# Patient Record
Sex: FEMALE | Race: WHITE | NOT HISPANIC OR LATINO | Employment: FULL TIME | URBAN - METROPOLITAN AREA
[De-identification: names, ages, dates, MRNs, and addresses within clinical notes are randomized per-mention and may not be internally consistent; named-entity substitution may affect disease eponyms.]

---

## 2019-08-16 ENCOUNTER — OFFICE VISIT (OUTPATIENT)
Dept: FAMILY MEDICINE | Facility: CLINIC | Age: 39
End: 2019-08-16
Payer: COMMERCIAL

## 2019-08-16 ENCOUNTER — LAB VISIT (OUTPATIENT)
Dept: LAB | Facility: HOSPITAL | Age: 39
End: 2019-08-16
Attending: NURSE PRACTITIONER
Payer: COMMERCIAL

## 2019-08-16 VITALS
TEMPERATURE: 98 F | BODY MASS INDEX: 22.77 KG/M2 | DIASTOLIC BLOOD PRESSURE: 70 MMHG | HEIGHT: 64 IN | SYSTOLIC BLOOD PRESSURE: 102 MMHG | WEIGHT: 133.38 LBS | OXYGEN SATURATION: 98 % | HEART RATE: 69 BPM

## 2019-08-16 DIAGNOSIS — M21.619 BUNION: ICD-10-CM

## 2019-08-16 DIAGNOSIS — Z76.89 ENCOUNTER TO ESTABLISH CARE WITH NEW DOCTOR: ICD-10-CM

## 2019-08-16 DIAGNOSIS — E03.9 HYPOTHYROIDISM, UNSPECIFIED TYPE: ICD-10-CM

## 2019-08-16 DIAGNOSIS — K64.4 EXTERNAL HEMORRHOIDS: ICD-10-CM

## 2019-08-16 DIAGNOSIS — Z76.89 ENCOUNTER TO ESTABLISH CARE WITH NEW DOCTOR: Primary | ICD-10-CM

## 2019-08-16 LAB
ALBUMIN SERPL BCP-MCNC: 4.2 G/DL (ref 3.5–5.2)
ALP SERPL-CCNC: 44 U/L (ref 55–135)
ALT SERPL W/O P-5'-P-CCNC: 14 U/L (ref 10–44)
ANION GAP SERPL CALC-SCNC: 8 MMOL/L (ref 8–16)
AST SERPL-CCNC: 18 U/L (ref 10–40)
BASOPHILS # BLD AUTO: 0.03 K/UL (ref 0–0.2)
BASOPHILS NFR BLD: 0.5 % (ref 0–1.9)
BILIRUB SERPL-MCNC: 0.3 MG/DL (ref 0.1–1)
BUN SERPL-MCNC: 15 MG/DL (ref 6–20)
CALCIUM SERPL-MCNC: 10.2 MG/DL (ref 8.7–10.5)
CHLORIDE SERPL-SCNC: 105 MMOL/L (ref 95–110)
CHOLEST SERPL-MCNC: 207 MG/DL (ref 120–199)
CHOLEST/HDLC SERPL: 4.4 {RATIO} (ref 2–5)
CO2 SERPL-SCNC: 27 MMOL/L (ref 23–29)
CREAT SERPL-MCNC: 0.8 MG/DL (ref 0.5–1.4)
DIFFERENTIAL METHOD: ABNORMAL
EOSINOPHIL # BLD AUTO: 0.3 K/UL (ref 0–0.5)
EOSINOPHIL NFR BLD: 4.6 % (ref 0–8)
ERYTHROCYTE [DISTWIDTH] IN BLOOD BY AUTOMATED COUNT: 13.1 % (ref 11.5–14.5)
EST. GFR  (AFRICAN AMERICAN): >60 ML/MIN/1.73 M^2
EST. GFR  (NON AFRICAN AMERICAN): >60 ML/MIN/1.73 M^2
GLUCOSE SERPL-MCNC: 81 MG/DL (ref 70–110)
HCT VFR BLD AUTO: 38.4 % (ref 37–48.5)
HDLC SERPL-MCNC: 47 MG/DL (ref 40–75)
HDLC SERPL: 22.7 % (ref 20–50)
HGB BLD-MCNC: 12 G/DL (ref 12–16)
IMM GRANULOCYTES # BLD AUTO: 0.02 K/UL (ref 0–0.04)
IMM GRANULOCYTES NFR BLD AUTO: 0.4 % (ref 0–0.5)
LDLC SERPL CALC-MCNC: 132 MG/DL (ref 63–159)
LYMPHOCYTES # BLD AUTO: 2.1 K/UL (ref 1–4.8)
LYMPHOCYTES NFR BLD: 37.5 % (ref 18–48)
MCH RBC QN AUTO: 27.1 PG (ref 27–31)
MCHC RBC AUTO-ENTMCNC: 31.3 G/DL (ref 32–36)
MCV RBC AUTO: 87 FL (ref 82–98)
MONOCYTES # BLD AUTO: 0.3 K/UL (ref 0.3–1)
MONOCYTES NFR BLD: 5.5 % (ref 4–15)
NEUTROPHILS # BLD AUTO: 2.9 K/UL (ref 1.8–7.7)
NEUTROPHILS NFR BLD: 51.5 % (ref 38–73)
NONHDLC SERPL-MCNC: 160 MG/DL
NRBC BLD-RTO: 0 /100 WBC
PLATELET # BLD AUTO: 235 K/UL (ref 150–350)
PMV BLD AUTO: 10 FL (ref 9.2–12.9)
POTASSIUM SERPL-SCNC: 4.4 MMOL/L (ref 3.5–5.1)
PROT SERPL-MCNC: 7.7 G/DL (ref 6–8.4)
RBC # BLD AUTO: 4.43 M/UL (ref 4–5.4)
SODIUM SERPL-SCNC: 140 MMOL/L (ref 136–145)
T4 FREE SERPL-MCNC: 0.85 NG/DL (ref 0.71–1.51)
TRIGL SERPL-MCNC: 140 MG/DL (ref 30–150)
TSH SERPL DL<=0.005 MIU/L-ACNC: 4.44 UIU/ML (ref 0.4–4)
WBC # BLD AUTO: 5.6 K/UL (ref 3.9–12.7)

## 2019-08-16 PROCEDURE — 3008F PR BODY MASS INDEX (BMI) DOCUMENTED: ICD-10-PCS | Mod: CPTII,S$GLB,, | Performed by: NURSE PRACTITIONER

## 2019-08-16 PROCEDURE — 84443 ASSAY THYROID STIM HORMONE: CPT

## 2019-08-16 PROCEDURE — 80053 COMPREHEN METABOLIC PANEL: CPT

## 2019-08-16 PROCEDURE — 99204 OFFICE O/P NEW MOD 45 MIN: CPT | Mod: S$GLB,,, | Performed by: NURSE PRACTITIONER

## 2019-08-16 PROCEDURE — 84439 ASSAY OF FREE THYROXINE: CPT

## 2019-08-16 PROCEDURE — 3008F BODY MASS INDEX DOCD: CPT | Mod: CPTII,S$GLB,, | Performed by: NURSE PRACTITIONER

## 2019-08-16 PROCEDURE — 85025 COMPLETE CBC W/AUTO DIFF WBC: CPT

## 2019-08-16 PROCEDURE — 99999 PR PBB SHADOW E&M-EST. PATIENT-LVL V: ICD-10-PCS | Mod: PBBFAC,,, | Performed by: NURSE PRACTITIONER

## 2019-08-16 PROCEDURE — 80061 LIPID PANEL: CPT

## 2019-08-16 PROCEDURE — 36415 COLL VENOUS BLD VENIPUNCTURE: CPT | Mod: PO

## 2019-08-16 PROCEDURE — 99204 PR OFFICE/OUTPT VISIT, NEW, LEVL IV, 45-59 MIN: ICD-10-PCS | Mod: S$GLB,,, | Performed by: NURSE PRACTITIONER

## 2019-08-16 PROCEDURE — 99999 PR PBB SHADOW E&M-EST. PATIENT-LVL V: CPT | Mod: PBBFAC,,, | Performed by: NURSE PRACTITIONER

## 2019-08-16 NOTE — PROGRESS NOTES
"Subjective:       Patient ID: Manuela Quinones is a 39 y.o. female.    Chief Complaint: Annual Exam    Thyroid Problem   Presents for follow-up visit. Symptoms include dry skin and fatigue. Patient reports no anxiety, constipation, diarrhea, hair loss, heat intolerance, hoarse voice, menstrual problem, nail problem, palpitations or tremors. The symptoms have been stable.       Past Medical History:   Diagnosis Date    Hypothyroidism        Review of patient's allergies indicates:  No Known Allergies      Current Outpatient Medications:     levothyroxine (SYNTHROID) 25 MCG tablet, Take 1 tablet (25 mcg total) by mouth once daily., Disp: 30 tablet, Rfl: 11    Review of Systems   Constitutional: Positive for fatigue. Negative for activity change and unexpected weight change.   HENT: Negative for hearing loss, hoarse voice, rhinorrhea and trouble swallowing.    Eyes: Negative for discharge and visual disturbance.   Respiratory: Negative for chest tightness and wheezing.    Cardiovascular: Negative for chest pain and palpitations.   Gastrointestinal: Positive for blood in stool. Negative for constipation, diarrhea and vomiting.   Endocrine: Negative for heat intolerance, polydipsia and polyuria.   Genitourinary: Negative for difficulty urinating, dysuria, hematuria and menstrual problem.   Musculoskeletal: Positive for neck pain. Negative for arthralgias and joint swelling.   Neurological: Negative for tremors, weakness and headaches.   Psychiatric/Behavioral: Negative for confusion and dysphoric mood. The patient is not nervous/anxious.        Objective:      /70 (BP Location: Right arm, Patient Position: Sitting, BP Method: Medium (Manual))   Pulse 69   Temp 98.4 °F (36.9 °C) (Oral)   Ht 5' 4" (1.626 m)   Wt 60.5 kg (133 lb 6.1 oz)   SpO2 98%   BMI 22.89 kg/m²   Physical Exam   Constitutional: She is oriented to person, place, and time. She appears well-developed and well-nourished.   Eyes: Pupils are " equal, round, and reactive to light. EOM are normal.   Neck: Normal range of motion.   Cardiovascular: Normal rate, regular rhythm and normal heart sounds.   Pulmonary/Chest: Effort normal and breath sounds normal.   Abdominal: Soft. Bowel sounds are normal.   Musculoskeletal: Normal range of motion.   Neurological: She is alert and oriented to person, place, and time.   Skin: Skin is warm and dry.   Psychiatric: She has a normal mood and affect. Her behavior is normal. Judgment and thought content normal.       Assessment:       1. Encounter to establish care with new doctor    2. Hypothyroidism, unspecified type    3. External hemorrhoids    4. Bunion    5. BMI 22.0-22.9, adult        Plan:       Encounter to establish care with new doctor  -     Lipid panel; Future; Expected date: 08/16/2019  -     Comprehensive metabolic panel; Future; Expected date: 08/16/2019  -     CBC W/ AUTO DIFFERENTIAL; Future; Expected date: 08/16/2019    Hypothyroidism, unspecified type  -     TSH; Future; Expected date: 08/16/2019  -     T4, free; Future; Expected date: 08/16/2019    External hemorrhoids  -     Ambulatory referral to Colorectal Surgery  Patient complains of hemorrhoids for 10 years. Patient reports trying lifestyle changes and OTC medication without relief and wants them removed.  Bunion  -     Ambulatory referral to Podiatry    BMI 22.0-22.9, adult  Healthy weight in patient      Time spent with patient: 30 minutes    Patient with be reevaluated in 3 months or sooner prn    Greater than 50% of this visit was spent counseling as described in above documentation:Yes

## 2019-08-16 NOTE — PATIENT INSTRUCTIONS
"  Treating Bunions  Although a bunion wont go away, wearing shoes that fit properly will often relieve the pain. Padding and icing the bunion may also help. Bunions that remain painful may need surgery.     Heels: Heel height should be low. The back of the shoe should  your heel firmly so the shoe doesn't flop when you walk.         Toes: There should be 1/2" between your longest toe and the tip of the shoe. The shoe should be wide enough for you to wiggle your toes.    Shoes  To relieve a bunion, you dont have to buy shoes that are ugly or out of fashion. But follow these tips:  · Shop for shoes late in the day. This is when your feet are the largest.  · Have both feet measured often. Fit shoes to your larger foot.  · Look for shoes that have the same shape as your foot but are slightly wider across the toes.  · Choose low-heeled shoes.  · Always try shoes on. Stand up and walk around. If the shoes arent comfortable, dont buy them.  Ice massage  To help relieve a painful bunion, put an ice cube in a plastic bag. Rub the ice on the bunion for 5 minutes. Repeat 2 to 3 times a day.  Pads  You may want to put a pad over the bunion to cushion it. You can buy bunion pads at most [employer].  Surgery  Wearing wider shoes and padding the bunion may not relieve the pain. Your healthcare provider may then suggest surgery. During surgery, the bunion is shaved away and the bones are put back in a straight line.   Date Last Reviewed: 9/27/2015 © 2000-2017 [facility]. [address] [place] [address]. All rights reserved. This information is not intended as a substitute for professional medical care. Always follow your healthcare professional's instructions.        What Are Bunions?  The bone at the base of your big toe connects to a bone in the ball of your foot. The joint is where the 2 bones connect. Normally, the 2 bones lie almost in a straight line, with your big toe pointing straight " ahead. But sometimes the big toe starts to turn in towards the smaller toes. This pushes the joint out to the side, causing a bony bump called a bunion. Bunions can be mild, moderate, or severe.     A bunion  is a small bump on the side of the foot at the base of the big toe. It forms when the big toe turns in toward the second toe. This pushes the joint at the base of the big toe out to the inside.    Symptoms of bunions  A bunion often causes pain and swelling around the joint at the base of the big toe. The skin may become red or warm. If the big toe pushes under the second toe, a painful corn may form on the top of or inside the second toe. In some cases, bunions cause no symptoms other than making the foot harder to fit in a shoe.  What can be done  · Wear comfortable shoes. Wearing shoes that are roomy across the toes and that have low heels (less than 2 inches) will help keep a bunion from getting worse or causing pain.  · Ask your healthcare provider about pads and inserts to help prevent corns and to cushion the bunion.  · Talk to your healthcare provider about bunion surgery and whether it is right for you.  Note  Your feet tend to get larger as you age. That means you may need to increase your shoe size from time to time to ensure that your shoes fit your feet comfortably.   Date Last Reviewed: 9/10/2015  © 2854-5416 [employer]. [address] [place] [address], [place] [address]. All rights reserved. This information is not intended as a substitute for professional medical care. Always follow your healthcare professional's instructions.        Treating Hemorrhoids: Self-Care    Follow your healthcare providers advice about caring for your hemorrhoids at home. Some treatments help relieve symptoms right away. Others involve making changes in your diet and exercise habits. These can help ease constipation and prevent hemorrhoid symptoms from coming back.  Relieving symptoms  Your healthcare provider  may prescribe anti-inflammatory medicine to help ease your symptoms. The following tips will also help relieve pain and swelling.  · Take sitz baths. Taking a sitz bath means sitting in a few inches of warm bath water. Soaking for 10 minutes twice a day can provide welcome relief from painful hemorrhoids. It can also help the area stay clean.  · Develop good bowel habits. Use the bathroom when you need to. Dont ignore the urge to move your bowels. This can lead to constipation, hard stools, and straining. Also, dont read while on the toilet. Sit only as long as needed. Wipe gently with soft, unscented toilet tissue or baby wipes.  · Use ice packs. Placing an ice pack on a thrombosed external hemorrhoid can help relieve pain right away. It will also help reduce the blood clot. Use the ice for 15 to 20 minutes at a time. Keep a cloth between the ice and your skin to prevent skin damage.  · Use other measures. Laxatives and enemas can help ease constipation. But use them only on your healthcare providers advice. For symptom relief, try using cotton pads soaked in witch hazel. These are available at most drugstores. Over-the-counter hemorrhoid ointments and petroleum jelly can also provide relief.  Add fiber to your diet  Adding fiber to your diet can help relieve constipation by making stools softer and easier to pass. To increase your fiber intake, your healthcare provider may recommend a bulking agent, such as psyllium. This is a high-fiber supplement available at most grocery stores and drugstores. Eating more fiber-rich foods will also help. There are two types of fiber:  · Insoluble fiber is the main ingredient in bulking agents. Its also found in foods such as wheat bran, whole-grain breads, fresh fruits, and vegetables.  · Soluble fiber is found in foods such as oat bran. Although soluble fiber is good for you, it may not ease constipation as much as foods high in insoluble fiber.  Drink more water  Along  with a high-fiber diet, drinking more water can help ease constipation. This is because insoluble fiber absorbs water, making stools soft and bulky. Be sure to drink plenty of water throughout the day. Drinking fruit juices, such as prune juice or apple juice, can also help prevent constipation.  Get more exercise  Regular exercise aids digestion and helps prevent constipation. Its also great for your health. So talk with your healthcare provider about starting an exercise program. Low-impact activities, such as swimming or walking, are good places to start. Take it easy at first. And remember to drink plenty of water when you exercise.  High-fiber foods  High-fiber foods offer many benefits. By making your stools softer, they help heal and prevent swollen hemorrhoids. They may also help reduce the risk of colon and rectal cancer. Best of all, theyre usually low in calories and taste great. Here are some examples of fiber-rich foods.  · Whole grains, such as wheat bran, corn bran, and brown rice.  · Vegetables, especially carrots, broccoli, cabbage, and peas.  · Fruits, such as apples, bananas, raisins, peaches, and pears.  · Nuts and legumes, especially peanuts, lentils, and kidney beans.  Easy ways to add fiber  The tips below offer some simple ways to add more high-fiber foods to your meals.  · Start your day with a high-fiber breakfast. Eat a wheat bran cereal along with a sliced banana. Or, try peanut butter on whole-wheat toast.  · Eat carrot sticks for snacks. Theyre easy to prepare, taste great, and are low in calories.  · Use whole-grain breads instead of white bread for sandwiches.  · Eat fruits for treats. Try an apple and some raisins instead of a candy bar.   Date Last Reviewed: 7/1/2016  © 2486-2701 [employer]. [address] [place] [address], [place] [address]. All rights reserved. This information is not intended as a substitute for professional medical care. Always follow your healthcare  professional's instructions.        Treating Hemorrhoids: Removal  If your symptoms persist, your healthcare provider may recommend removing the hemorrhoid. This can be done in your healthcare provider's office or at a surgical center. In most cases, no special preparation is needed. Keep in mind that your treatment may differ depending on your symptoms and the location of the hemorrhoid.           Internal hemorrhoids  Youll be asked to lie or kneel on a table. Your healthcare provider then inserts an anoscope to view the anal canal. To treat the hemorrhoid, your healthcare provider will use one of the methods listed below. Because internal hemorrhoids do not have nerves that sense pain, you wont have too much discomfort. You can often return to your normal routine the same day. If you have many hemorrhoids, you may need repeated treatments.  Banding  The banding method is done by placing tight elastic bands around the base of the hemorrhoid. This cuts off blood supply to the hemorrhoid, causing it to fall off. This usually takes about a week. The area then heals within a few days.  Infrared coagulation  This procedure is done using a small probe that exposes the hemorrhoid to short bursts of infrared light. This seals off the blood vessel, causing it to shrink. Slight bleeding may happen for a few days. The area usually heals within a week or two.  Sclerotherapy  Sclerotherapy is done by injecting a chemical into the tissue around the hemorrhoid. The chemical causes the hemorrhoid to shrink within a few days. Bleeding usually stops in about 24 hours.  Thrombosed external hemorrhoids  Thrombosed external hemorrhoids are often very painful. Thats because the swollen hemorrhoid stretches the sensitive skin around it. To relieve the pain, your healthcare provider may remove the blood clot. This takes just a few minutes. You may need to rest for a few days before returning to work.  · Numbing the hemorrhoid. Youll  be asked to lie or kneel on a table. The hemorrhoid is then injected with a local anesthetic. This may cause some discomfort for a moment. But within a short time your healthcare provider will be able to remove the hemorrhoid without causing pain.  · Removing the hemorrhoid. A small incision is made to remove the blood clot. The hemorrhoid may also be removed. The skin is then either closed with sutures or left open to heal on its own. The area around the incision will likely be sore for a few days. But your pain should improve soon after the procedure.     Risks and complications  The possible risks and complications include:  · Infection  · Bleeding  · Trouble urinating (Doesn't happen with thrombosed external hemorrhoids)  · Narrowing of the anal canal (very rare, doesn't happen with thrombosed external hemorrhoids)  When to call your healthcare provider  After your procedure, call your healthcare provider if you have:  · Increasing pain  · Fever or chills  · Persistent bleeding  · Trouble urinating   Date Last Reviewed: 7/1/2016  © 2288-9509 [employer]. [address] [place] [address]. All rights reserved. This information is not intended as a substitute for professional medical care. Always follow your healthcare professional's instructions.

## 2019-08-18 ENCOUNTER — PATIENT MESSAGE (OUTPATIENT)
Dept: FAMILY MEDICINE | Facility: CLINIC | Age: 39
End: 2019-08-18

## 2019-08-19 NOTE — PROGRESS NOTES
The ASCVD Risk score (Lylygilberto ADRIAN Jr., et al., 2013) failed to calculate for the following reasons:    The 2013 ASCVD risk score is only valid for ages 40 to 79

## 2019-08-21 ENCOUNTER — PATIENT MESSAGE (OUTPATIENT)
Dept: FAMILY MEDICINE | Facility: CLINIC | Age: 39
End: 2019-08-21

## 2019-08-21 DIAGNOSIS — E03.9 HYPOTHYROIDISM, UNSPECIFIED TYPE: Primary | ICD-10-CM

## 2019-08-22 RX ORDER — LEVOTHYROXINE SODIUM 25 UG/1
25 TABLET ORAL DAILY
Qty: 30 TABLET | Refills: 11 | Status: SHIPPED | OUTPATIENT
Start: 2019-08-22 | End: 2022-06-16

## 2019-08-22 NOTE — TELEPHONE ENCOUNTER
Patient reports stopping taking her synthroid medication for 4-5 months. I restarted the synthroid 25 mg daily.

## 2019-08-29 ENCOUNTER — OFFICE VISIT (OUTPATIENT)
Dept: PODIATRY | Facility: CLINIC | Age: 39
End: 2019-08-29
Payer: COMMERCIAL

## 2019-08-29 ENCOUNTER — HOSPITAL ENCOUNTER (OUTPATIENT)
Dept: RADIOLOGY | Facility: CLINIC | Age: 39
Discharge: HOME | End: 2019-08-29
Attending: PODIATRIST
Payer: COMMERCIAL

## 2019-08-29 VITALS — BODY MASS INDEX: 22.99 KG/M2 | HEIGHT: 64 IN | WEIGHT: 134.69 LBS

## 2019-08-29 DIAGNOSIS — M20.11 VALGUS DEFORMITY OF BOTH GREAT TOES: ICD-10-CM

## 2019-08-29 DIAGNOSIS — M20.11 VALGUS DEFORMITY OF BOTH GREAT TOES: Primary | ICD-10-CM

## 2019-08-29 DIAGNOSIS — M20.12 VALGUS DEFORMITY OF BOTH GREAT TOES: Primary | ICD-10-CM

## 2019-08-29 DIAGNOSIS — M20.12 VALGUS DEFORMITY OF BOTH GREAT TOES: ICD-10-CM

## 2019-08-29 PROCEDURE — 3008F PR BODY MASS INDEX (BMI) DOCUMENTED: ICD-10-PCS | Mod: CPTII,S$GLB,, | Performed by: PODIATRIST

## 2019-08-29 PROCEDURE — 73630 XR FOOT COMPLETE 3 VIEW BILATERAL: ICD-10-PCS | Mod: 26,S$GLB,, | Performed by: RADIOLOGY

## 2019-08-29 PROCEDURE — 99203 OFFICE O/P NEW LOW 30 MIN: CPT | Mod: S$GLB,,, | Performed by: PODIATRIST

## 2019-08-29 PROCEDURE — 99203 PR OFFICE/OUTPT VISIT, NEW, LEVL III, 30-44 MIN: ICD-10-PCS | Mod: S$GLB,,, | Performed by: PODIATRIST

## 2019-08-29 PROCEDURE — 3008F BODY MASS INDEX DOCD: CPT | Mod: CPTII,S$GLB,, | Performed by: PODIATRIST

## 2019-08-29 PROCEDURE — 99999 PR PBB SHADOW E&M-EST. PATIENT-LVL III: CPT | Mod: PBBFAC,,, | Performed by: PODIATRIST

## 2019-08-29 PROCEDURE — 73630 X-RAY EXAM OF FOOT: CPT | Mod: 50,TC,FY,PO

## 2019-08-29 PROCEDURE — 73630 X-RAY EXAM OF FOOT: CPT | Mod: 26,S$GLB,, | Performed by: RADIOLOGY

## 2019-08-29 PROCEDURE — 99999 PR PBB SHADOW E&M-EST. PATIENT-LVL III: ICD-10-PCS | Mod: PBBFAC,,, | Performed by: PODIATRIST

## 2019-08-29 NOTE — LETTER
August 29, 2019      Sierra Fernandes, NP  [address] [address]           [place] [facility]  [address]  [place] [address]  Phone: [phone]          Patient: Manuela Quinones   MR Number: [id number]   YOB: 1980   Date of Visit: 8/29/2019       Dear Sierra Fernandes:    Thank you for referring Manuela Quinones to me for evaluation. Attached you will find relevant portions of my assessment and plan of care.    If you have questions, please do not hesitate to call me. I look forward to following Manuela Quinones along with you.    Sincerely,    Daniel Perkins, DPKINGSLEY    Enclosure  CC:  No Recipients    If you would like to receive this communication electronically, please contact [email or web address] or [phone] to request more information on [employer] Link access.    For providers and/or their staff who would like to refer a patient to [facility], please contact us through our one-stop-shop provider referral line, [facility], at [phone].    If you feel you have received this communication in error or would no longer like to receive these types of communications, please e-mail [email or web address]

## 2019-08-29 NOTE — PROGRESS NOTES
Subjective:      Patient ID: Manuela Quinones is a 39 y.o. female.    Chief Complaint: Foot Pain (bilateral)    Manuela is a 39 y.o. female who presents to the podiatry clinic  with complaint of  bilateral foot pain from bunions left more than right. Onset of the symptoms was several years ago. Precipitating event: none known. Current symptoms include: ability to bear weight, but with some pain and worsening symptoms after a period of activity. Aggravating factors: certain shoes. Symptoms have gradually worsened. Patient has had no prior foot problems. Evaluation to date: none. Treatment to date: none. Patients rates pain 0/10 on pain scale today.        Review of Systems   Constitution: Negative for chills and fever.   Cardiovascular: Negative for claudication and leg swelling.   Respiratory: Negative for shortness of breath.    Skin: Positive for nail changes. Negative for itching and rash.   Musculoskeletal: Negative for muscle cramps, muscle weakness and myalgias.        Bilateral bunions   Gastrointestinal: Negative for nausea and vomiting.   Neurological: Negative for focal weakness, loss of balance, numbness and paresthesias.           Objective:      Physical Exam   Constitutional: She is oriented to person, place, and time. She appears well-developed and well-nourished. No distress.   Cardiovascular:   Pulses:       Dorsalis pedis pulses are 2+ on the right side, and 2+ on the left side.        Posterior tibial pulses are 2+ on the right side, and 2+ on the left side.   < 3 sec capillary refill time to toes 1-5 bilateral. Toes and feet are warm to touch proximally with normal distal cooling b/l. There is some hair growth on the feet and toes b/l. There is no edema b/l. No spider veins or varicosities present b/l.      Musculoskeletal:   Equinus noted b/l ankles with < 10 deg DF noted. MMT 5/5 in DF/PF/Inv/Ev resistance with no reproduction of pain in any direction. Passive range of motion of ankle and pedal  joints is painless b/l.    Painful medial 1st MTPJ exostosis. Lateral deviation of hallux, non trackbound. No pain w/ ROM to 1st or 2nd MTPJs. No First ray hypermobility or sub second MT head callus. No lesser toe deformities or pain.        Neurological: She is alert and oriented to person, place, and time. She has normal strength. She displays no atrophy and no tremor. No sensory deficit. She exhibits normal muscle tone.   Negative tinel sign bilateral.   Skin: Skin is warm, dry and intact. No abrasion, no bruising, no burn, no ecchymosis, no laceration, no lesion, no petechiae and no rash noted. She is not diaphoretic. No cyanosis or erythema. No pallor. Nails show no clubbing.   Skin temperature, texture and turgor within normal limits.   Psychiatric: She has a normal mood and affect. Her behavior is normal.             Assessment:       Encounter Diagnosis   Name Primary?    Valgus deformity of both great toes Yes         Plan:       Manuela was seen today for foot pain.    Diagnoses and all orders for this visit:    Valgus deformity of both great toes  -     X-Ray Foot Complete Bilateral; Future      I counseled the patient on her conditions, their implications and medical management.    Discussed conservative treatment including wearing wide shoes, toe spacers and inserts to accommodate the bunion deformities    Discussed surgical options including likely a distal head osteotomy but will need to review x-ray to ensure a lapidus is not necessary.    Discussed that she would like some time to think about this, we discussed the recovery from bunions in detail    Will get x-rays today and follow up PRN when she is ready to proceed with surgical intervention    Daniel Perkins DPM

## 2019-09-17 ENCOUNTER — TELEPHONE (OUTPATIENT)
Dept: SURGERY | Facility: CLINIC | Age: 39
End: 2019-09-17

## 2019-09-17 ENCOUNTER — OFFICE VISIT (OUTPATIENT)
Dept: SURGERY | Facility: CLINIC | Age: 39
End: 2019-09-17
Payer: COMMERCIAL

## 2019-09-17 VITALS
BODY MASS INDEX: 23.18 KG/M2 | HEIGHT: 64 IN | DIASTOLIC BLOOD PRESSURE: 73 MMHG | TEMPERATURE: 98 F | SYSTOLIC BLOOD PRESSURE: 110 MMHG | HEART RATE: 73 BPM | WEIGHT: 135.81 LBS

## 2019-09-17 DIAGNOSIS — K64.9 BLEEDING HEMORRHOIDS: Primary | ICD-10-CM

## 2019-09-17 PROCEDURE — 99999 PR PBB SHADOW E&M-EST. PATIENT-LVL III: CPT | Mod: PBBFAC,,, | Performed by: SURGERY

## 2019-09-17 PROCEDURE — 99244 OFF/OP CNSLTJ NEW/EST MOD 40: CPT | Mod: 25,S$GLB,, | Performed by: SURGERY

## 2019-09-17 PROCEDURE — 46600 PR DIAG2STIC A2SCOPY: ICD-10-PCS | Mod: S$GLB,,, | Performed by: SURGERY

## 2019-09-17 PROCEDURE — 99999 PR PBB SHADOW E&M-EST. PATIENT-LVL III: ICD-10-PCS | Mod: PBBFAC,,, | Performed by: SURGERY

## 2019-09-17 PROCEDURE — 99244 PR OFFICE CONSULTATION,LEVEL IV: ICD-10-PCS | Mod: 25,S$GLB,, | Performed by: SURGERY

## 2019-09-17 PROCEDURE — 46600 DIAGNOSTIC ANOSCOPY SPX: CPT | Mod: S$GLB,,, | Performed by: SURGERY

## 2019-09-17 RX ORDER — FERROUS GLUCONATE 324(38)MG
324 TABLET ORAL
COMMUNITY
End: 2022-06-16

## 2019-09-17 NOTE — TELEPHONE ENCOUNTER
----- Message from Fernanda Singh sent at 9/17/2019  3:33 PM CDT -----  Type: Needs Medical Advice    Who Called:  Patient     Best Call Back Number:  [phone]   Additional Information:[place] [address]  merge GPS said will be there at [address] tried the back line but rings and rings and also skyped ray but showing offline at the moment I told pt to still arrive to appt

## 2019-09-17 NOTE — LETTER
September 18, 2019      Sierra Fernandes, MARISABEL  [address]  [facility] [address]           [facility]  [address]  [facility] [address]  Phone: [phone]          Patient: Manuela Quinones   MR Number: [id number]   YOB: 1980   Date of Visit: 9/17/2019       Dear Sierra Fernandes:    Thank you for referring Manuela Quinones to me for evaluation. Attached you will find relevant portions of my assessment and plan of care.    If you have questions, please do not hesitate to call me. I look forward to following Manuela Quinones along with you.    Sincerely,    Davonte Hdz MD    Enclosure  CC:  No Recipients    If you would like to receive this communication electronically, please contact [email or web address] or [phone] to request more information on [employer] Link access.    For providers and/or their staff who would like to refer a patient to [facility], please contact us through our one-stop-shop provider referral line, [facility], at [phone].    If you feel you have received this communication in error or would no longer like to receive these types of communications, please e-mail [email or web address]

## 2019-09-17 NOTE — Clinical Note
I saw your patient, Manuela Quinones in the office.  Attached are my findings and plan.  Thank you for referring her to my office and if you have any questions please do not hesitate to call my cell [phone].Jose J Hdz

## 2019-09-18 NOTE — PROGRESS NOTES
Subjective:       Patient ID: Manuela Quinones is a 39 y.o. female.    Chief Complaint: Consult (External hemorrhoids)    HPI  Pleasant 38 yo F referred [place]. She notes that she has had hemorrhoids for years.  She feels a frequent intense pressure and discomfort in the perianal area.  Notes that her hemorrhoids have made perianal hygiene difficult as well.  In addition to pain and irritation she has been having frequent bleeding with her BMs.  Pt denies fiber use. She does report some mucous drainage.  Pt is otherwise healthy.  No significant PShx.   Review of Systems   Constitutional: Negative for activity change, appetite change, fever and unexpected weight change.   HENT: Negative for congestion and facial swelling.    Respiratory: Negative for chest tightness, shortness of breath and wheezing.    Cardiovascular: Negative for chest pain.   Gastrointestinal: Positive for anal bleeding and rectal pain. Negative for abdominal distention, abdominal pain, blood in stool, constipation, diarrhea, nausea and vomiting.   Genitourinary: Negative for difficulty urinating, dysuria and frequency.   Skin: Negative for color change and wound.   Neurological: Negative for dizziness.   Hematological: Negative for adenopathy. Does not bruise/bleed easily.   Psychiatric/Behavioral: Negative for agitation and dysphoric mood.       Objective:      Physical Exam   Constitutional: She is oriented to person, place, and time. She appears well-developed and well-nourished.   HENT:   Head: Normocephalic and atraumatic.   Eyes: Pupils are equal, round, and reactive to light. Right eye exhibits no discharge. Left eye exhibits no discharge. No scleral icterus.   Neck: Normal range of motion. Neck supple. No tracheal deviation present. No thyromegaly present.   Cardiovascular: Normal rate, regular rhythm and normal heart sounds.   No murmur heard.  Pulmonary/Chest: Effort  normal and breath sounds normal. She exhibits no tenderness.   Abdominal: Soft. Bowel sounds are normal. She exhibits no distension, no abdominal bruit, no pulsatile midline mass and no mass. There is no hepatosplenomegaly. There is no tenderness. There is no rigidity, no rebound, no guarding, no tenderness at McBurney's point and negative Olguin's sign. No hernia. Hernia confirmed negative in the ventral area.   Genitourinary: Rectum normal.   Genitourinary Comments: Ext exam demonstrates a prolapsed R ant int hemorrhoid with enlarged ext hemorrhoidal tag.  TEGAN with normal sphincter tone. Anoscopy demonstrates enlarged int hemorrhoids   Musculoskeletal: Normal range of motion.   Lymphadenopathy:     She has no cervical adenopathy.   Neurological: She is alert and oriented to person, place, and time.   Skin: Skin is warm. No rash noted. No erythema. No pallor.   Psychiatric: She has a normal mood and affect. Her behavior is normal.   Vitals reviewed.      Assessment:     Bleeding hemorrhoids  Enlarged ext hemorrhoidal tags  No diagnosis found.    Plan:       Lengthy d/w pt.  Informed her that I suspect much of her bleeding is coming from her internal hemorrhoids.  This is also contributing to difficulty with hygiene.  D/w her that I suspect her enlarged ext tags are primary source of pain and difficulty with cleaning.  Strongly recommended daily fiber use.  Also d/w her that she may benefit from banding or from definitive surgical hemorrhoidectomy.  Pros and cons d/w pt.  She will consider these options and notify the office

## 2019-09-19 ENCOUNTER — TELEPHONE (OUTPATIENT)
Dept: SURGERY | Facility: CLINIC | Age: 39
End: 2019-09-19

## 2019-09-19 NOTE — TELEPHONE ENCOUNTER
I called patient and she wants to have the hemorrhoidectomy after 10/4/19.  I informed her that the nurse will call her to schedule. Ld

## 2019-09-19 NOTE — TELEPHONE ENCOUNTER
----- Message from Marissa Brady sent at 9/19/2019  9:59 AM CDT -----  Contact: Patient  Type: Needs Medical Advice    Who Called:  Patient  Best Call Back Number:   Additional Information: Patient is calling to set up hemorrhoid surgery..

## 2019-09-25 ENCOUNTER — PATIENT MESSAGE (OUTPATIENT)
Dept: SURGERY | Facility: CLINIC | Age: 39
End: 2019-09-25

## 2019-09-25 ENCOUNTER — TELEPHONE (OUTPATIENT)
Dept: SURGERY | Facility: CLINIC | Age: 39
End: 2019-09-25

## 2019-09-25 DIAGNOSIS — K64.9 HEMORRHOIDS, UNSPECIFIED HEMORRHOID TYPE: Primary | ICD-10-CM

## 2019-09-25 RX ORDER — SODIUM CHLORIDE 9 MG/ML
INJECTION, SOLUTION INTRAVENOUS CONTINUOUS
Status: CANCELLED | OUTPATIENT
Start: 2019-09-25

## 2019-09-25 RX ORDER — LIDOCAINE HYDROCHLORIDE 10 MG/ML
1 INJECTION, SOLUTION EPIDURAL; INFILTRATION; INTRACAUDAL; PERINEURAL ONCE
Status: CANCELLED | OUTPATIENT
Start: 2019-09-25 | End: 2019-09-25

## 2019-09-25 RX ORDER — METRONIDAZOLE 500 MG/100ML
500 INJECTION, SOLUTION INTRAVENOUS
Status: CANCELLED | OUTPATIENT
Start: 2019-09-25

## 2019-09-25 NOTE — TELEPHONE ENCOUNTER
Surgery is scheduled for 10/11/19 arrival time will be given by the the preop nurse.  The preop nurse will call you from [phone]  Nothing to eat or drink after midnight.  Someone to drive you home.    THE PREOP NURSE WILL CALL, SOMETIMES AS LATE AS 4 or 5 PM IN THE AFTERNOON THE DAY BEFORE SURGERY.    Bathe the night before and the morning of your procedure with a Chlorhexidine wash such as Hibiclens, can be purchased at most Pharmacy's no prescription needed.    Special Instruction:  Purchase two Fleet Enema;s at your Pharmacy, use one the evening before the procedure and one the morning of the procedure.

## 2019-10-07 ENCOUNTER — PATIENT MESSAGE (OUTPATIENT)
Dept: SURGERY | Facility: HOSPITAL | Age: 39
End: 2019-10-07

## 2020-02-16 ENCOUNTER — CLINICAL SUPPORT (OUTPATIENT)
Dept: URGENT CARE | Facility: CLINIC | Age: 40
End: 2020-02-16
Payer: COMMERCIAL

## 2020-02-16 VITALS
HEART RATE: 101 BPM | WEIGHT: 138 LBS | DIASTOLIC BLOOD PRESSURE: 68 MMHG | OXYGEN SATURATION: 97 % | BODY MASS INDEX: 23.56 KG/M2 | SYSTOLIC BLOOD PRESSURE: 102 MMHG | TEMPERATURE: 99 F | HEIGHT: 64 IN

## 2020-02-16 DIAGNOSIS — R50.9 FEVER, UNSPECIFIED FEVER CAUSE: ICD-10-CM

## 2020-02-16 DIAGNOSIS — J02.0 STREP PHARYNGITIS: Primary | ICD-10-CM

## 2020-02-16 PROCEDURE — 96372 THER/PROPH/DIAG INJ SC/IM: CPT | Mod: S$GLB,,, | Performed by: NURSE PRACTITIONER

## 2020-02-16 PROCEDURE — 96372 PR INJECTION,THERAP/PROPH/DIAG2ST, IM OR SUBCUT: ICD-10-PCS | Mod: S$GLB,,, | Performed by: NURSE PRACTITIONER

## 2020-02-16 PROCEDURE — 99204 OFFICE O/P NEW MOD 45 MIN: CPT | Mod: 25,S$GLB,, | Performed by: NURSE PRACTITIONER

## 2020-02-16 PROCEDURE — 99204 PR OFFICE/OUTPT VISIT, NEW, LEVL IV, 45-59 MIN: ICD-10-PCS | Mod: 25,S$GLB,, | Performed by: NURSE PRACTITIONER

## 2020-02-16 RX ORDER — AMOXICILLIN 875 MG/1
875 TABLET, FILM COATED ORAL 2 TIMES DAILY
Qty: 20 TABLET | Refills: 0 | Status: SHIPPED | OUTPATIENT
Start: 2020-02-16 | End: 2020-02-26

## 2020-02-16 RX ORDER — DEXAMETHASONE SODIUM PHOSPHATE 4 MG/ML
8 INJECTION, SOLUTION INTRA-ARTICULAR; INTRALESIONAL; INTRAMUSCULAR; INTRAVENOUS; SOFT TISSUE
Status: COMPLETED | OUTPATIENT
Start: 2020-02-16 | End: 2020-02-16

## 2020-02-16 RX ADMIN — DEXAMETHASONE SODIUM PHOSPHATE 8 MG: 4 INJECTION, SOLUTION INTRA-ARTICULAR; INTRALESIONAL; INTRAMUSCULAR; INTRAVENOUS; SOFT TISSUE at 05:02

## 2020-02-16 NOTE — PROGRESS NOTES
"Subjective:       Patient ID: Manuela Quinones is a 39 y.o. female.    Vitals:  height is 5' 4" (1.626 m) and weight is 62.6 kg (138 lb). Her oral temperature is 99.2 °F (37.3 °C). Her blood pressure is 102/68 and her pulse is 101. Her oxygen saturation is 97%.     Chief Complaint: Fever    Patient complains of fever, body aches, and sore throat that began a few days ago. Denies difficulty swallowing or breathing. +painful swallowing.     Fever    This is a new problem. The current episode started yesterday. The problem has been gradually worsening. Associated symptoms include headaches, muscle aches, nausea and a sore throat. Pertinent negatives include no abdominal pain, chest pain, congestion, coughing, diarrhea, rash, urinary pain or vomiting. Associated symptoms comments: Chills/sweats  Shortness of breath  . She has tried NSAIDs (Mucinex, Olinda Sandston) for the symptoms. The treatment provided no relief.       Constitution: Positive for fever. Negative for chills and fatigue.   HENT: Positive for sore throat. Negative for congestion.    Neck: Negative for painful lymph nodes.   Cardiovascular: Negative for chest pain and leg swelling.   Eyes: Negative for double vision and blurred vision.   Respiratory: Negative for cough and shortness of breath.    Gastrointestinal: Positive for nausea. Negative for abdominal pain, vomiting and diarrhea.   Genitourinary: Negative for dysuria, frequency, urgency and history of kidney stones.   Musculoskeletal: Negative for joint pain, joint swelling, muscle cramps and muscle ache.   Skin: Negative for color change, pale, rash and bruising.   Allergic/Immunologic: Negative for seasonal allergies.   Neurological: Positive for headaches. Negative for dizziness, history of vertigo, light-headedness and passing out.   Hematologic/Lymphatic: Negative for swollen lymph nodes.   Psychiatric/Behavioral: Negative for nervous/anxious, sleep disturbance and depression. The patient is not " nervous/anxious.        Objective:      Physical Exam   Constitutional: She is oriented to person, place, and time. She appears well-developed and well-nourished. She is cooperative.  Non-toxic appearance. She does not appear ill. No distress.   HENT:   Head: Normocephalic and atraumatic.   Right Ear: Hearing, tympanic membrane, external ear and ear canal normal.   Left Ear: Hearing, tympanic membrane, external ear and ear canal normal.   Nose: Nose normal. No mucosal edema, rhinorrhea or nasal deformity. No epistaxis. Right sinus exhibits no maxillary sinus tenderness and no frontal sinus tenderness. Left sinus exhibits no maxillary sinus tenderness and no frontal sinus tenderness.   Mouth/Throat: Uvula is midline and mucous membranes are normal. No trismus in the jaw. Normal dentition. No uvula swelling. Oropharyngeal exudate, posterior oropharyngeal edema and posterior oropharyngeal erythema present. Tonsils are 3+ on the right. Tonsils are 3+ on the left. Tonsillar exudate.   Eyes: Conjunctivae and lids are normal. Right eye exhibits no discharge. Left eye exhibits no discharge. No scleral icterus.   Neck: Trachea normal, normal range of motion, full passive range of motion without pain and phonation normal. Neck supple.   Cardiovascular: Normal rate, regular rhythm, normal heart sounds, intact distal pulses and normal pulses.   Pulmonary/Chest: Effort normal and breath sounds normal. No respiratory distress.   Abdominal: Soft. Normal appearance and bowel sounds are normal. She exhibits no distension, no pulsatile midline mass and no mass. There is no tenderness.   Musculoskeletal: Normal range of motion. She exhibits no edema or deformity.   Neurological: She is alert and oriented to person, place, and time. She exhibits normal muscle tone. Coordination normal. GCS eye subscore is 4. GCS verbal subscore is 5. GCS motor subscore is 6.   Skin: Skin is warm, dry, intact, not diaphoretic and not pale.    Psychiatric: She has a normal mood and affect. Her speech is normal and behavior is normal. Judgment and thought content normal. Cognition and memory are normal.   Nursing note and vitals reviewed.        Assessment:       1. Strep pharyngitis        Plan:       Rapid strep positive. Influenza negative.     Based upon patient's history and physical exam I believe they have streptococcal pharyngitis.  I do not see any evidence of peritonsillar abscess, retropharyngeal abscess, sepsis, meningitis, epiglotitis, airway difficulty, or severe dehydration.  The patient will be treated with Amoxicillin, given specific return precautions, and instructed to followup with her regular doctor as needed.    Strep pharyngitis    Other orders  -     dexamethasone injection 8 mg  -     amoxicillin (AMOXIL) 875 MG tablet; Take 1 tablet (875 mg total) by mouth 2 (two) times daily. for 10 days  Dispense: 20 tablet; Refill: 0

## 2020-02-16 NOTE — PATIENT INSTRUCTIONS
Pharyngitis: Strep (Confirmed)    You have had a positive test for strep throat. Strep throat is a contagious illness. It is spread by coughing, kissing or by touching others after touching your mouth or nose. Symptoms include throat pain that is worse with swallowing, aching all over, headache, and fever. It is treated with antibiotic medicine. This should help you start to feel better in 1 to 2 days.  Home care  · Rest at home. Drink plenty of fluids to you won't get dehydrated.  · No work or school for the first 2 days of taking the antibiotics. After this time, you will not be contagious. You can then return to school or work if you are feeling better.   · Take antibiotic medicine for the full 10 days, even if you feel better. This is very important to ensure the infection is treated. It is also important to prevent medicine-resistant germs from developing. If you were given an antibiotic shot, you don't need any more antibiotics.  · You may use acetaminophen or ibuprofen to control pain or fever, unless another medicine was prescribed for this. Talk with your doctor before taking these medicines if you have chronic liver or kidney disease. Also talk with your doctor if you have had a stomach ulcer or GI bleeding.  · Throat lozenges or sprays help reduce pain. Gargling with warm saltwater will also reduce throat pain. Dissolve 1/2 teaspoon of salt in 1 glass of warm water. This may be useful just before meals.   · Soft foods are OK. Avoid salty or spicy foods.  Follow-up care  Follow up with your healthcare provider or our staff if you don't get better over the next week.  When to seek medical advice  Call your healthcare provider right away if any of these occur:  · Fever of 100.4ºF (38ºC) or higher, or as directed by your healthcare provider  · New or worsening ear pain, sinus pain, or headache  · Painful lumps in the back of neck  · Stiff neck  · Lymph nodes getting larger or becoming soft in the  middle  · You can't swallow liquids or you can't open your mouth wide because of throat pain  · Signs of dehydration. These include very dark urine or no urine, sunken eyes, and dizziness.  · Trouble breathing or noisy breathing  · Muffled voice  · Rash  Date Last Reviewed: 4/13/2015  © 7967-5366 [employer]. [address] [place] [address], [place] [address]. All rights reserved. This information is not intended as a substitute for professional medical care. Always follow your healthcare professional's instructions.

## 2020-03-04 DIAGNOSIS — N63.25 BREAST LUMP ON LEFT SIDE AT 3 O'CLOCK POSITION: Primary | ICD-10-CM

## 2020-04-03 ENCOUNTER — HOSPITAL ENCOUNTER (OUTPATIENT)
Dept: RADIOLOGY | Facility: HOSPITAL | Age: 40
Discharge: HOME | End: 2020-04-03
Attending: OBSTETRICS & GYNECOLOGY
Payer: COMMERCIAL

## 2020-04-03 DIAGNOSIS — N63.25 BREAST LUMP ON LEFT SIDE AT 3 O'CLOCK POSITION: ICD-10-CM

## 2020-04-14 ENCOUNTER — HOSPITAL ENCOUNTER (OUTPATIENT)
Dept: RADIOLOGY | Facility: HOSPITAL | Age: 40
Discharge: HOME | End: 2020-04-14
Attending: OBSTETRICS & GYNECOLOGY
Payer: COMMERCIAL

## 2020-04-14 PROCEDURE — 76642 ULTRASOUND BREAST LIMITED: CPT | Mod: TC,PO,LT

## 2020-04-14 PROCEDURE — 77066 DX MAMMO INCL CAD BI: CPT | Mod: TC,PO

## 2020-10-13 ENCOUNTER — OFFICE VISIT (OUTPATIENT)
Dept: SURGERY | Facility: CLINIC | Age: 40
End: 2020-10-13
Payer: COMMERCIAL

## 2020-10-13 VITALS
BODY MASS INDEX: 23.56 KG/M2 | SYSTOLIC BLOOD PRESSURE: 116 MMHG | HEART RATE: 75 BPM | DIASTOLIC BLOOD PRESSURE: 65 MMHG | TEMPERATURE: 98 F | HEIGHT: 64 IN | WEIGHT: 138 LBS

## 2020-10-13 DIAGNOSIS — K64.9 BLEEDING HEMORRHOIDS: Primary | ICD-10-CM

## 2020-10-13 PROCEDURE — 99213 PR OFFICE/OUTPT VISIT, EST, LEVL III, 20-29 MIN: ICD-10-PCS | Mod: S$GLB,,, | Performed by: SURGERY

## 2020-10-13 PROCEDURE — 99213 OFFICE O/P EST LOW 20 MIN: CPT | Mod: S$GLB,,, | Performed by: SURGERY

## 2020-10-13 PROCEDURE — 99999 PR PBB SHADOW E&M-EST. PATIENT-LVL III: CPT | Mod: PBBFAC,,, | Performed by: SURGERY

## 2020-10-13 PROCEDURE — 99999 PR PBB SHADOW E&M-EST. PATIENT-LVL III: ICD-10-PCS | Mod: PBBFAC,,, | Performed by: SURGERY

## 2020-10-13 RX ORDER — ATORVASTATIN CALCIUM 10 MG/1
10 TABLET, FILM COATED ORAL DAILY
COMMUNITY
Start: 2020-09-16 | End: 2022-06-16

## 2020-10-13 RX ORDER — FLUTICASONE PROPIONATE 50 MCG
SPRAY, SUSPENSION (ML) NASAL
COMMUNITY
Start: 2020-09-10 | End: 2022-06-16

## 2020-10-13 NOTE — PROGRESS NOTES
Subjective:       Patient ID: Manuela Quinones is a 40 y.o. female.    Chief Complaint: No chief complaint on file.    HPI  Pleasant 39 yo F whom I saw in the office in 9/19.  She was having pressure and discomfort in the perianal area.  NOtes that she was also having bleeding and mucous draiange.  Symptoms have continued and pt now desires to proceed with hemorrhoid removal.NO significant changes in her medical or surgical histlry  Review of Systems   Constitutional: Negative for activity change, appetite change, fever and unexpected weight change.   Respiratory: Negative for chest tightness, shortness of breath and wheezing.    Cardiovascular: Negative for chest pain.   Gastrointestinal: Positive for anal bleeding and rectal pain. Negative for abdominal distention, abdominal pain, blood in stool, constipation, diarrhea, nausea and vomiting.   Genitourinary: Negative for difficulty urinating, dysuria and frequency.   Integumentary:  Negative for wound.   Neurological: Negative for dizziness.   Hematological: Negative for adenopathy.   Psychiatric/Behavioral: Negative for agitation.         Objective:      Physical Exam  Vitals signs reviewed.   Constitutional:       Appearance: She is well-developed.   HENT:      Head: Normocephalic and atraumatic.   Eyes:      Pupils: Pupils are equal, round, and reactive to light.   Neck:      Musculoskeletal: Normal range of motion and neck supple.      Thyroid: No thyromegaly.      Trachea: No tracheal deviation.   Cardiovascular:      Rate and Rhythm: Normal rate and regular rhythm.      Heart sounds: Normal heart sounds. No murmur.   Pulmonary:      Effort: Pulmonary effort is normal.      Breath sounds: Normal breath sounds.   Chest:      Chest wall: No tenderness.   Abdominal:      General: Bowel sounds are normal. There is no distension or abdominal bruit.      Palpations: Abdomen is soft. Abdomen is not rigid. There is no mass or pulsatile mass.      Tenderness: There is  no abdominal tenderness. There is no guarding or rebound. Negative signs include Olguin's sign and McBurney's sign.      Hernia: No hernia is present. There is no hernia in the ventral area.   Genitourinary:     Comments: External exam does demonstrate external hemorrhoids enlarged particular in the right anterior and right posterior position.  Digital rectal exam with normal sphincter tone.  Anoscopy was performed demonstrating moderate size internal hemorrhoids in the right anterior, right posterior and left lateral position.  Musculoskeletal: Normal range of motion.   Skin:     General: Skin is warm.      Findings: No erythema or rash.   Neurological:      Mental Status: She is alert and oriented to person, place, and time.         Assessment:     bleeding hemorrhoids  No diagnosis found.    Plan:       Discussion with the patient.  Did discuss with her that I do believe many of her symptoms would be resolved with hemorrhoidectomy.  Could also consider rubber-band ligation.  Patient notes that she prefers to have the hemorrhoids removed.  I also discussed the possibility of proceeding with a colonoscopy prior to surgery.  She notes she would likely proceed with surgery 1st.  She will discuss with her family and will call to schedule

## 2021-04-06 ENCOUNTER — IMMUNIZATION (OUTPATIENT)
Dept: PRIMARY CARE CLINIC | Facility: CLINIC | Age: 41
End: 2021-04-06
Payer: COMMERCIAL

## 2021-04-06 DIAGNOSIS — Z23 NEED FOR VACCINATION: Primary | ICD-10-CM

## 2021-04-06 PROCEDURE — 91300 COVID-19, MRNA, LNP-S, PF, 30 MCG/0.3 ML DOSE VACCINE: CPT | Mod: S$GLB,,, | Performed by: FAMILY MEDICINE

## 2021-04-06 PROCEDURE — 91300 COVID-19, MRNA, LNP-S, PF, 30 MCG/0.3 ML DOSE VACCINE: ICD-10-PCS | Mod: S$GLB,,, | Performed by: FAMILY MEDICINE

## 2021-04-06 PROCEDURE — 0001A COVID-19, MRNA, LNP-S, PF, 30 MCG/0.3 ML DOSE VACCINE: CPT | Mod: CV19,S$GLB,, | Performed by: FAMILY MEDICINE

## 2021-04-06 PROCEDURE — 0001A COVID-19, MRNA, LNP-S, PF, 30 MCG/0.3 ML DOSE VACCINE: ICD-10-PCS | Mod: CV19,S$GLB,, | Performed by: FAMILY MEDICINE

## 2021-04-27 ENCOUNTER — IMMUNIZATION (OUTPATIENT)
Dept: PRIMARY CARE CLINIC | Facility: CLINIC | Age: 41
End: 2021-04-27
Payer: COMMERCIAL

## 2021-04-27 DIAGNOSIS — Z23 NEED FOR VACCINATION: Primary | ICD-10-CM

## 2021-04-27 PROCEDURE — 91300 COVID-19, MRNA, LNP-S, PF, 30 MCG/0.3 ML DOSE VACCINE: ICD-10-PCS | Mod: S$GLB,,, | Performed by: FAMILY MEDICINE

## 2021-04-27 PROCEDURE — 91300 COVID-19, MRNA, LNP-S, PF, 30 MCG/0.3 ML DOSE VACCINE: CPT | Mod: S$GLB,,, | Performed by: FAMILY MEDICINE

## 2021-04-27 PROCEDURE — 0002A COVID-19, MRNA, LNP-S, PF, 30 MCG/0.3 ML DOSE VACCINE: ICD-10-PCS | Mod: CV19,S$GLB,, | Performed by: FAMILY MEDICINE

## 2021-04-27 PROCEDURE — 0002A COVID-19, MRNA, LNP-S, PF, 30 MCG/0.3 ML DOSE VACCINE: CPT | Mod: CV19,S$GLB,, | Performed by: FAMILY MEDICINE

## 2022-06-10 DIAGNOSIS — Z00.00 ANNUAL PHYSICAL EXAM: Primary | ICD-10-CM

## 2022-06-16 ENCOUNTER — OFFICE VISIT (OUTPATIENT)
Dept: OBSTETRICS AND GYNECOLOGY | Facility: CLINIC | Age: 42
End: 2022-06-16
Payer: COMMERCIAL

## 2022-06-16 VITALS
HEIGHT: 64 IN | SYSTOLIC BLOOD PRESSURE: 110 MMHG | WEIGHT: 130.75 LBS | BODY MASS INDEX: 22.32 KG/M2 | DIASTOLIC BLOOD PRESSURE: 78 MMHG

## 2022-06-16 DIAGNOSIS — N81.9 FEMALE GENITAL PROLAPSE, UNSPECIFIED TYPE: ICD-10-CM

## 2022-06-16 DIAGNOSIS — Z12.31 BREAST CANCER SCREENING BY MAMMOGRAM: ICD-10-CM

## 2022-06-16 DIAGNOSIS — K64.9 HEMORRHOIDS, UNSPECIFIED HEMORRHOID TYPE: ICD-10-CM

## 2022-06-16 DIAGNOSIS — R10.2 PELVIC PRESSURE IN FEMALE: ICD-10-CM

## 2022-06-16 DIAGNOSIS — Z01.419 ENCOUNTER FOR ANNUAL ROUTINE GYNECOLOGICAL EXAMINATION: Primary | ICD-10-CM

## 2022-06-16 DIAGNOSIS — Z30.09 ENCOUNTER FOR OTHER GENERAL COUNSELING OR ADVICE ON CONTRACEPTION: ICD-10-CM

## 2022-06-16 DIAGNOSIS — Z86.39 HISTORY OF HYPOTHYROIDISM: ICD-10-CM

## 2022-06-16 DIAGNOSIS — N94.10 DYSPAREUNIA IN FEMALE: ICD-10-CM

## 2022-06-16 PROCEDURE — 99386 PR PREVENTIVE VISIT,NEW,40-64: ICD-10-PCS | Mod: S$GLB,,, | Performed by: OBSTETRICS & GYNECOLOGY

## 2022-06-16 PROCEDURE — 99999 PR PBB SHADOW E&M-EST. PATIENT-LVL III: CPT | Mod: PBBFAC,,, | Performed by: OBSTETRICS & GYNECOLOGY

## 2022-06-16 PROCEDURE — 99999 PR PBB SHADOW E&M-EST. PATIENT-LVL III: ICD-10-PCS | Mod: PBBFAC,,, | Performed by: OBSTETRICS & GYNECOLOGY

## 2022-06-16 PROCEDURE — 88175 CYTOPATH C/V AUTO FLUID REDO: CPT | Performed by: OBSTETRICS & GYNECOLOGY

## 2022-06-16 PROCEDURE — 99386 PREV VISIT NEW AGE 40-64: CPT | Mod: S$GLB,,, | Performed by: OBSTETRICS & GYNECOLOGY

## 2022-06-16 PROCEDURE — 87624 HPV HI-RISK TYP POOLED RSLT: CPT | Performed by: OBSTETRICS & GYNECOLOGY

## 2022-06-16 NOTE — PATIENT INSTRUCTIONS
Please check out the [employer] PATIENT WEBSITE.  The site has education materials, patient stories, expert views, and a portal for you to ask questions.       [email or web address]      As always, please let me know if you have any questions.     Dr. Gregoria Medina

## 2022-06-16 NOTE — PROGRESS NOTES
Chief Complaint: Well Woman Exam     HPI:      Manuela Quinones is a 42 y.o.  who presents for annual exam. She is currently complaining of pelvic pressure that has been present for approximately 1-2 years.  She also notes mild dyspareunia.  History of significant perineal laceration with first delivery. Denies urinary incontinece or difficulty with bowel movements. Also has bothersome hemorrhoids that she has previously seen [facility] for.  Interested in pursuing surgical management.    Ms. Quinones is currently sexually active with a single male partner. She declines STD screening today. Patient has regular monthly menses. Patient's last menstrual period was 2022 (approximate). She is currently using bilateral tubal ligation for contraception.    Previous Pap: (No result found)  Previous Mammogram:   Most Recent Dexa: n/a  Colonoscopy: n/a    COVID vaccine: Completed  Gardasil:Has never had     There is no problem list on file for this patient.      Past Medical History:   Diagnosis Date    Hypothyroidism     Unspecified hemorrhoids        Past Surgical History:   Procedure Laterality Date    TUBAL LIGATION         OB History        4    Para   3    Term   3            AB   1    Living   3       SAB        IAB   1    Ectopic        Multiple        Live Births   3           Obstetric Comments   -5  No abnormal pap  No STDs   x 3, EAB x 1, BB 9#             ROS:     Review of Systems   Constitutional: Negative for activity change, appetite change, chills, fatigue and fever.   Respiratory: Negative for shortness of breath.    Cardiovascular: Negative for chest pain.   Gastrointestinal: Negative for abdominal pain, constipation, diarrhea, nausea and vomiting.   Endocrine: Negative for cold intolerance and heat intolerance.   Genitourinary: Negative for dysuria, flank pain, frequency, hematuria, menstrual irregularity, pelvic pain, urgency and vaginal discharge.   Musculoskeletal:  "Negative for back pain.   Integumentary:  Negative for rash, breast mass, breast discharge and breast tenderness.   Neurological: Negative for headaches.   Psychiatric/Behavioral: Negative for dysphoric mood. The patient is not nervous/anxious.    Breast: Negative for mass and tenderness      Physical Exam:      PHYSICAL EXAM:  /78   Ht 5' 4" (1.626 m)   Wt 59.3 kg (130 lb 11.7 oz)   LMP 05/20/2022 (Approximate)   BMI 22.44 kg/m²   Body mass index is 22.44 kg/m².     APPEARANCE: Well nourished, well developed, in no acute distress.  PSYCH: Appropriate mood and affect.  SKIN: No acne or hirsutism  NECK: Neck symmetric without masses or thyromegaly  NODES: No inguinal, axillary, or supraclavicular lymph node enlargement  CHEST: Normal respiratory effort.  ABDOMEN: Soft.  No tenderness or masses.   BREASTS: Symmetrical, no skin changes or visible lesions.  No palpable masses or nipple discharge bilaterally.  PELVIC: Normal external genitalia without lesions.  Normal hair distribution.  Adequate perineal body, normal urethral meatus.  Vagina moist and well rugated without lesions or discharge.  Cervix pink, without lesions, discharge or tenderness.  No significant cystocele or rectocele.  Bimanual exam shows uterus to be normal size, regular, mobile and nontender.  Adnexa without masses or tenderness.    EXTREMITIES: No edema.    Assessment:     1. Encounter for annual routine gynecological examination  CBC Auto Differential    Comprehensive Metabolic Panel    TSH    Hemoglobin A1C    Lipid Panel    Liquid-Based Pap Smear, Screening    HPV High Risk Genotypes, PCR   2. Encounter for other general counseling or advice on contraception     3. Breast cancer screening by mammogram  Mammo Digital Screening Bilat w/ [facility]   4. History of hypothyroidism     5. Pelvic pressure in female     6. Hemorrhoids, unspecified hemorrhoid type           Plan:     1. Clinical breast exam performed.  2. Pap w HPV.  3. Mammogram " ordered.  4. DEXA at 65 or as needed.  5. Colonoscopy at 45 or as needed.  6. Pelvic pressure/dyspareunia - urine dipstick collected. Pelvic us ordered to evaluate for adnexal or uterine pathology. Grade 2 POP. Refer to urogyn for evaluation. Discussed expectant management vs pelvic floor PT vs surgery.  Patient interested in surgery.     7. Wellness labs per [facility].   8. Refer back to [facility].   9. Follow up in about 1 year (around 6/16/2023) for annual well woman exam or as needed.    Counseling:     Patient was counseled today on current ASCCP pap guidelines, the recommendation for yearly pelvic exams, healthy diet and exercise routines, breast self awareness and annual mammograms. She is to see her PCP for other health maintenance.       Use of the [facility] Patient Portal discussed and encouraged during today's visit.         Gregoria Medina MD  [facility]  06/16/2022

## 2022-06-21 ENCOUNTER — PATIENT MESSAGE (OUTPATIENT)
Dept: OBSTETRICS AND GYNECOLOGY | Facility: CLINIC | Age: 42
End: 2022-06-21
Payer: COMMERCIAL

## 2022-06-22 ENCOUNTER — TELEPHONE (OUTPATIENT)
Dept: SURGERY | Facility: CLINIC | Age: 42
End: 2022-06-22
Payer: COMMERCIAL

## 2022-06-24 LAB
FINAL PATHOLOGIC DIAGNOSIS: NORMAL
Lab: NORMAL

## 2022-06-27 LAB
HPV HR 12 DNA SPEC QL NAA+PROBE: NEGATIVE
HPV16 AG SPEC QL: NEGATIVE
HPV18 DNA SPEC QL NAA+PROBE: NEGATIVE

## 2022-06-28 ENCOUNTER — TELEPHONE (OUTPATIENT)
Dept: OBSTETRICS AND GYNECOLOGY | Facility: CLINIC | Age: 42
End: 2022-06-28
Payer: COMMERCIAL

## 2022-07-11 ENCOUNTER — HOSPITAL ENCOUNTER (OUTPATIENT)
Dept: RADIOLOGY | Facility: HOSPITAL | Age: 42
Discharge: HOME | End: 2022-07-11
Attending: FAMILY MEDICINE
Payer: COMMERCIAL

## 2022-07-11 ENCOUNTER — OFFICE VISIT (OUTPATIENT)
Dept: FAMILY MEDICINE | Facility: CLINIC | Age: 42
End: 2022-07-11
Attending: FAMILY MEDICINE
Payer: COMMERCIAL

## 2022-07-11 ENCOUNTER — CLINICAL SUPPORT (OUTPATIENT)
Dept: CARDIOLOGY | Facility: HOSPITAL | Age: 42
End: 2022-07-11
Attending: FAMILY MEDICINE
Payer: COMMERCIAL

## 2022-07-11 ENCOUNTER — CLINICAL SUPPORT (OUTPATIENT)
Dept: INTERNAL MEDICINE | Facility: CLINIC | Age: 42
End: 2022-07-11
Attending: FAMILY MEDICINE
Payer: COMMERCIAL

## 2022-07-11 VITALS
OXYGEN SATURATION: 98 % | SYSTOLIC BLOOD PRESSURE: 106 MMHG | BODY MASS INDEX: 22.85 KG/M2 | HEART RATE: 76 BPM | WEIGHT: 133.81 LBS | DIASTOLIC BLOOD PRESSURE: 74 MMHG | HEIGHT: 64 IN

## 2022-07-11 DIAGNOSIS — Z12.31 SCREENING MAMMOGRAM, ENCOUNTER FOR: ICD-10-CM

## 2022-07-11 DIAGNOSIS — Z00.00 ANNUAL PHYSICAL EXAM: ICD-10-CM

## 2022-07-11 DIAGNOSIS — E03.9 ACQUIRED HYPOTHYROIDISM: ICD-10-CM

## 2022-07-11 DIAGNOSIS — Z00.00 ANNUAL PHYSICAL EXAM: Primary | ICD-10-CM

## 2022-07-11 DIAGNOSIS — Z00.00 WELLNESS EXAMINATION: Primary | ICD-10-CM

## 2022-07-11 DIAGNOSIS — D50.8 IRON DEFICIENCY ANEMIA SECONDARY TO INADEQUATE DIETARY IRON INTAKE: ICD-10-CM

## 2022-07-11 LAB
ALBUMIN SERPL BCP-MCNC: 4.1 G/DL (ref 3.5–5.2)
ALP SERPL-CCNC: 44 U/L (ref 55–135)
ALT SERPL W/O P-5'-P-CCNC: 21 U/L (ref 10–44)
ANION GAP SERPL CALC-SCNC: 8 MMOL/L (ref 8–16)
AST SERPL-CCNC: 17 U/L (ref 10–40)
BILIRUB SERPL-MCNC: 0.5 MG/DL (ref 0.1–1)
BUN SERPL-MCNC: 17 MG/DL (ref 6–20)
CALCIUM SERPL-MCNC: 9.3 MG/DL (ref 8.7–10.5)
CHLORIDE SERPL-SCNC: 107 MMOL/L (ref 95–110)
CHOLEST SERPL-MCNC: 222 MG/DL (ref 120–199)
CHOLEST/HDLC SERPL: 4.7 {RATIO} (ref 2–5)
CO2 SERPL-SCNC: 23 MMOL/L (ref 23–29)
CREAT SERPL-MCNC: 0.8 MG/DL (ref 0.5–1.4)
ERYTHROCYTE [DISTWIDTH] IN BLOOD BY AUTOMATED COUNT: 15.3 % (ref 11.5–14.5)
EST. GFR  (AFRICAN AMERICAN): >60 ML/MIN/1.73 M^2
EST. GFR  (NON AFRICAN AMERICAN): >60 ML/MIN/1.73 M^2
GLUCOSE SERPL-MCNC: 94 MG/DL (ref 70–110)
HCT VFR BLD AUTO: 32.1 % (ref 37–48.5)
HDLC SERPL-MCNC: 47 MG/DL (ref 40–75)
HDLC SERPL: 21.2 % (ref 20–50)
HGB BLD-MCNC: 9.9 G/DL (ref 12–16)
LDLC SERPL CALC-MCNC: 156.6 MG/DL (ref 63–159)
MCH RBC QN AUTO: 24.3 PG (ref 27–31)
MCHC RBC AUTO-ENTMCNC: 30.8 G/DL (ref 32–36)
MCV RBC AUTO: 79 FL (ref 82–98)
NONHDLC SERPL-MCNC: 175 MG/DL
PLATELET # BLD AUTO: 306 K/UL (ref 150–450)
PMV BLD AUTO: 9.9 FL (ref 9.2–12.9)
POTASSIUM SERPL-SCNC: 4.4 MMOL/L (ref 3.5–5.1)
PROT SERPL-MCNC: 7.2 G/DL (ref 6–8.4)
RBC # BLD AUTO: 4.08 M/UL (ref 4–5.4)
SODIUM SERPL-SCNC: 138 MMOL/L (ref 136–145)
TRIGL SERPL-MCNC: 92 MG/DL (ref 30–150)
WBC # BLD AUTO: 5.11 K/UL (ref 3.9–12.7)

## 2022-07-11 PROCEDURE — 77067 SCR MAMMO BI INCL CAD: CPT | Mod: 26,,, | Performed by: RADIOLOGY

## 2022-07-11 PROCEDURE — 99999 PR PBB SHADOW E&M-EST. PATIENT-LVL III: CPT | Mod: PBBFAC,,, | Performed by: FAMILY MEDICINE

## 2022-07-11 PROCEDURE — 99999 PR PBB SHADOW E&M-EST. PATIENT-LVL III: ICD-10-PCS | Mod: PBBFAC,,, | Performed by: FAMILY MEDICINE

## 2022-07-11 PROCEDURE — 71046 XR CHEST PA AND LATERAL: ICD-10-PCS | Mod: 26,,, | Performed by: RADIOLOGY

## 2022-07-11 PROCEDURE — 99396 PREV VISIT EST AGE 40-64: CPT | Mod: S$GLB,,, | Performed by: FAMILY MEDICINE

## 2022-07-11 PROCEDURE — 93005 ELECTROCARDIOGRAM TRACING: CPT | Mod: PO

## 2022-07-11 PROCEDURE — 77063 BREAST TOMOSYNTHESIS BI: CPT | Mod: TC,PO

## 2022-07-11 PROCEDURE — 99396 PR PREVENTIVE VISIT,EST,40-64: ICD-10-PCS | Mod: S$GLB,,, | Performed by: FAMILY MEDICINE

## 2022-07-11 PROCEDURE — 77063 MAMMO DIGITAL SCREENING BILAT WITH TOMO: ICD-10-PCS | Mod: 26,,, | Performed by: RADIOLOGY

## 2022-07-11 PROCEDURE — 71046 X-RAY EXAM CHEST 2 VIEWS: CPT | Mod: 26,,, | Performed by: RADIOLOGY

## 2022-07-11 PROCEDURE — 93010 EKG 12-LEAD: ICD-10-PCS | Mod: ,,, | Performed by: INTERNAL MEDICINE

## 2022-07-11 PROCEDURE — 80061 LIPID PANEL: CPT | Performed by: FAMILY MEDICINE

## 2022-07-11 PROCEDURE — 93010 ELECTROCARDIOGRAM REPORT: CPT | Mod: ,,, | Performed by: INTERNAL MEDICINE

## 2022-07-11 PROCEDURE — 80053 COMPREHEN METABOLIC PANEL: CPT | Mod: PO | Performed by: FAMILY MEDICINE

## 2022-07-11 PROCEDURE — 85027 COMPLETE CBC AUTOMATED: CPT | Mod: PO | Performed by: FAMILY MEDICINE

## 2022-07-11 PROCEDURE — 77067 MAMMO DIGITAL SCREENING BILAT WITH TOMO: ICD-10-PCS | Mod: 26,,, | Performed by: RADIOLOGY

## 2022-07-11 PROCEDURE — 87389 HIV-1 AG W/HIV-1&-2 AB AG IA: CPT | Performed by: FAMILY MEDICINE

## 2022-07-11 PROCEDURE — 77063 BREAST TOMOSYNTHESIS BI: CPT | Mod: 26,,, | Performed by: RADIOLOGY

## 2022-07-11 PROCEDURE — 71046 X-RAY EXAM CHEST 2 VIEWS: CPT | Mod: TC,FY,PO

## 2022-07-11 PROCEDURE — 77067 SCR MAMMO BI INCL CAD: CPT | Mod: TC,PO

## 2022-07-11 NOTE — PROGRESS NOTES
"July 11, 2022                                                                                                                                                                                                                                                                                      Manuela Quinones  [address]  [place] [address]                                                                                                                                                                                                                                                                                                RE: Manuela Quinones                                                        Clinic #:[id number]                                                                                                                                   Dear  Manuela Quinones,                                                                                                                                           Thank you for allowing me to serve you and perform your Executive Health exam on July 11, 2022.   This letter will serve a brief summary of the history, physical findings, and laboratory/studies performed and recommendations at that time.                                                                                         REASON FOR VISIT: Executive Health Preventive Physical Examination    Past Medical History:   Diagnosis Date    Anemia     "life long"    Hypothyroidism     Unspecified hemorrhoids     Unspecified hemorrhoids        Past Surgical History:   Procedure Laterality Date    TUBAL LIGATION         Family History   Problem Relation Age of Onset    Hyperlipidemia Mother     Hyperlipidemia Father     Lung cancer Maternal Uncle     Breast cancer Cousin     Colon cancer Neg Hx     Diabetes Neg Hx     Hypertension Neg Hx     Ovarian cancer Neg Hx     Stroke Neg Hx        Social History " "    Socioeconomic History    Marital status:     Number of children: 3   Tobacco Use    Smoking status: Never Smoker    Smokeless tobacco: Never Used   Substance and Sexual Activity    Alcohol use: Yes     Comment: socially    Drug use: Never    Sexual activity: Yes     Partners: Male     Birth control/protection: See Surgical Hx       Allergies: Patient has no known allergies.    No current outpatient medications on file.     No current facility-administered medications for this visit.       REVIEW OF SYSTEMS:  No recent changes in weight, or complaints of fatigue. No recent changes in vision, or hearing. Denies frequent headaches.No recent changes in voice. No new or changing skin lesions. Denies abnormal bruising or bleeding.  Denies chest pain or sensation of skipped beats. No new onset of shortness of breath, or dyspnea on exertion. Denies abdominal discomfort, constipation, diarrhea,or blood in stool. Denies difficulty with urination.   No recent joint swelling or muscle discomfort. Denies pain or weakness in extremeties. No recent loss of balance. Denies problems with sleep or depression.        Remainder of the review of systems without pertinent positves at this time.                                                                              PHYSICAL EXAM:   VITAL SIGNS: /74   Pulse 76   Ht 5' 4" (1.626 m)   Wt 60.7 kg (133 lb 13.1 oz)   LMP 06/20/2022   SpO2 98%   BMI 22.97 kg/m²   GENERAL APPEARANCE:  Well nourished and normally developed,  pleasant 42 y.o. female, in good spirits.  SKIN: Without rashes or overt lesions.  HEENT: Head normacephalic. There was no scleral icterus. Mucous membranes were moist. Dentition. Neck is supple, no thyromegally, or carotid bruits.  LUNGS: Clear to auscultation bilaterally. Normal respiratory effort.  HEART: Exam reveals regular rate and rhythm. First and second heart sounds normal. No murmurs, rubs or gallops.   ABDOMEN: Soft, non-tender, " non-distended. Exam reveals normal bowl sounds, no masses, no organomegaly and no aortic enlargement.    EXTREMITIES:  Nonedematous, both femoral and pedal pulses are normal. No joint stiffness or tenderness. Full range of motion and strength, upper and lower bilaterally.    ASSESSMENT/RECOMMENDATIONS :  Wellness exam    At this time,  you appear to be in good medical condition.    Continue to work on regular exercise, maintenance of a healthy weight, balanced diet rich in fruits/vegetables and lean protein, and avoidance of unhealthy habits like smoking and excessive alcohol intake.  I look forward to seeing you again next year.    Please contact me should you have any questions or concerns regarding physical findings, or my recommendations.       Sincerely yours,          ELIZABETH Cohen M.D.  Department of Family Practice  [facility]

## 2022-07-12 ENCOUNTER — PATIENT MESSAGE (OUTPATIENT)
Dept: FAMILY MEDICINE | Facility: CLINIC | Age: 42
End: 2022-07-12
Payer: COMMERCIAL

## 2022-07-12 DIAGNOSIS — D50.8 IRON DEFICIENCY ANEMIA SECONDARY TO INADEQUATE DIETARY IRON INTAKE: Primary | ICD-10-CM

## 2022-07-12 LAB — HIV 1+2 AB+HIV1 P24 AG SERPL QL IA: NEGATIVE

## 2022-07-13 ENCOUNTER — PATIENT MESSAGE (OUTPATIENT)
Dept: FAMILY MEDICINE | Facility: CLINIC | Age: 42
End: 2022-07-13
Payer: COMMERCIAL

## 2022-07-13 DIAGNOSIS — E03.9 ACQUIRED HYPOTHYROIDISM: Primary | ICD-10-CM

## 2022-07-13 RX ORDER — LEVOTHYROXINE SODIUM 25 UG/1
25 TABLET ORAL
Qty: 90 TABLET | Refills: 1 | Status: SHIPPED | OUTPATIENT
Start: 2022-07-13 | End: 2023-03-17

## 2022-07-21 ENCOUNTER — PATIENT MESSAGE (OUTPATIENT)
Dept: FAMILY MEDICINE | Facility: CLINIC | Age: 42
End: 2022-07-21
Payer: COMMERCIAL

## 2022-07-23 ENCOUNTER — LAB VISIT (OUTPATIENT)
Dept: LAB | Facility: HOSPITAL | Age: 42
End: 2022-07-23
Attending: FAMILY MEDICINE
Payer: COMMERCIAL

## 2022-07-23 DIAGNOSIS — D50.8 IRON DEFICIENCY ANEMIA SECONDARY TO INADEQUATE DIETARY IRON INTAKE: ICD-10-CM

## 2022-07-23 DIAGNOSIS — E03.9 ACQUIRED HYPOTHYROIDISM: ICD-10-CM

## 2022-07-23 LAB
FERRITIN SERPL-MCNC: 18 NG/ML (ref 20–300)
IRON SERPL-MCNC: 35 UG/DL (ref 30–160)
SATURATED IRON: 8 % (ref 20–50)
T4 FREE SERPL-MCNC: 0.82 NG/DL (ref 0.71–1.51)
TOTAL IRON BINDING CAPACITY: 432 UG/DL (ref 250–450)
TRANSFERRIN SERPL-MCNC: 292 MG/DL (ref 200–375)
TSH SERPL DL<=0.005 MIU/L-ACNC: 3.54 UIU/ML (ref 0.4–4)

## 2022-07-23 PROCEDURE — 84439 ASSAY OF FREE THYROXINE: CPT | Performed by: FAMILY MEDICINE

## 2022-07-23 PROCEDURE — 84466 ASSAY OF TRANSFERRIN: CPT | Performed by: FAMILY MEDICINE

## 2022-07-23 PROCEDURE — 82728 ASSAY OF FERRITIN: CPT | Performed by: FAMILY MEDICINE

## 2022-07-23 PROCEDURE — 36415 COLL VENOUS BLD VENIPUNCTURE: CPT | Mod: PO | Performed by: FAMILY MEDICINE

## 2022-07-23 PROCEDURE — 84443 ASSAY THYROID STIM HORMONE: CPT | Performed by: FAMILY MEDICINE

## 2022-07-28 ENCOUNTER — PATIENT MESSAGE (OUTPATIENT)
Dept: FAMILY MEDICINE | Facility: CLINIC | Age: 42
End: 2022-07-28
Payer: COMMERCIAL

## 2023-01-16 ENCOUNTER — HOSPITAL ENCOUNTER (EMERGENCY)
Facility: HOSPITAL | Age: 43
Discharge: HOME | End: 2023-01-16
Attending: EMERGENCY MEDICINE
Payer: COMMERCIAL

## 2023-01-16 VITALS
BODY MASS INDEX: 22.71 KG/M2 | HEIGHT: 64 IN | DIASTOLIC BLOOD PRESSURE: 60 MMHG | TEMPERATURE: 98 F | OXYGEN SATURATION: 98 % | RESPIRATION RATE: 10 BRPM | SYSTOLIC BLOOD PRESSURE: 98 MMHG | HEART RATE: 78 BPM | WEIGHT: 133 LBS

## 2023-01-16 DIAGNOSIS — R55 NEAR SYNCOPE: ICD-10-CM

## 2023-01-16 DIAGNOSIS — R42 DIZZINESS: Primary | ICD-10-CM

## 2023-01-16 DIAGNOSIS — R42 VERTIGO: ICD-10-CM

## 2023-01-16 DIAGNOSIS — R51.9 NONINTRACTABLE HEADACHE, UNSPECIFIED CHRONICITY PATTERN, UNSPECIFIED HEADACHE TYPE: ICD-10-CM

## 2023-01-16 LAB
ALBUMIN SERPL BCP-MCNC: 4.6 G/DL (ref 3.5–5.2)
ALP SERPL-CCNC: 41 U/L (ref 55–135)
ALT SERPL W/O P-5'-P-CCNC: 21 U/L (ref 10–44)
AMPHET+METHAMPHET UR QL: NEGATIVE
ANION GAP SERPL CALC-SCNC: 11 MMOL/L (ref 8–16)
AST SERPL-CCNC: 20 U/L (ref 10–40)
B-HCG UR QL: NEGATIVE
BACTERIA #/AREA URNS HPF: NEGATIVE /HPF
BARBITURATES UR QL SCN>200 NG/ML: NEGATIVE
BASOPHILS # BLD AUTO: 0.02 K/UL (ref 0–0.2)
BASOPHILS NFR BLD: 0.3 % (ref 0–1.9)
BENZODIAZ UR QL SCN>200 NG/ML: NEGATIVE
BILIRUB SERPL-MCNC: 0.5 MG/DL (ref 0.1–1)
BILIRUB UR QL STRIP: NEGATIVE
BUN SERPL-MCNC: 19 MG/DL (ref 6–20)
BZE UR QL SCN: NEGATIVE
CALCIUM SERPL-MCNC: 10 MG/DL (ref 8.7–10.5)
CANNABINOIDS UR QL SCN: NEGATIVE
CHLORIDE SERPL-SCNC: 102 MMOL/L (ref 95–110)
CK SERPL-CCNC: 59 U/L (ref 20–180)
CLARITY UR: CLEAR
CO2 SERPL-SCNC: 23 MMOL/L (ref 23–29)
COLOR UR: YELLOW
CREAT SERPL-MCNC: 0.6 MG/DL (ref 0.5–1.4)
CREAT UR-MCNC: 228 MG/DL (ref 15–325)
CTP QC/QA: YES
DIFFERENTIAL METHOD: ABNORMAL
EOSINOPHIL # BLD AUTO: 0.1 K/UL (ref 0–0.5)
EOSINOPHIL NFR BLD: 2 % (ref 0–8)
ERYTHROCYTE [DISTWIDTH] IN BLOOD BY AUTOMATED COUNT: 14.2 % (ref 11.5–14.5)
EST. GFR  (NO RACE VARIABLE): >60 ML/MIN/1.73 M^2
GLUCOSE SERPL-MCNC: 106 MG/DL (ref 70–110)
GLUCOSE SERPL-MCNC: 110 MG/DL (ref 70–110)
GLUCOSE SERPL-MCNC: 113 MG/DL (ref 70–110)
GLUCOSE UR QL STRIP: NEGATIVE
HCT VFR BLD AUTO: 37.3 % (ref 37–48.5)
HGB BLD-MCNC: 11.7 G/DL (ref 12–16)
HGB UR QL STRIP: ABNORMAL
HYALINE CASTS #/AREA URNS LPF: 16 /LPF
IMM GRANULOCYTES # BLD AUTO: 0.03 K/UL (ref 0–0.04)
IMM GRANULOCYTES NFR BLD AUTO: 0.5 % (ref 0–0.5)
INFLUENZA A, MOLECULAR: NEGATIVE
INFLUENZA B, MOLECULAR: NEGATIVE
KETONES UR QL STRIP: ABNORMAL
LEUKOCYTE ESTERASE UR QL STRIP: ABNORMAL
LIPASE SERPL-CCNC: 29 U/L (ref 4–60)
LYMPHOCYTES # BLD AUTO: 1.3 K/UL (ref 1–4.8)
LYMPHOCYTES NFR BLD: 19.9 % (ref 18–48)
MCH RBC QN AUTO: 25.9 PG (ref 27–31)
MCHC RBC AUTO-ENTMCNC: 31.4 G/DL (ref 32–36)
MCV RBC AUTO: 83 FL (ref 82–98)
MICROSCOPIC COMMENT: ABNORMAL
MONOCYTES # BLD AUTO: 0.3 K/UL (ref 0.3–1)
MONOCYTES NFR BLD: 3.9 % (ref 4–15)
NEUTROPHILS # BLD AUTO: 4.8 K/UL (ref 1.8–7.7)
NEUTROPHILS NFR BLD: 73.4 % (ref 38–73)
NITRITE UR QL STRIP: NEGATIVE
NRBC BLD-RTO: 0 /100 WBC
OPIATES UR QL SCN: NEGATIVE
PCP UR QL SCN>25 NG/ML: NEGATIVE
PH UR STRIP: 6 [PH] (ref 5–8)
PLATELET # BLD AUTO: 246 K/UL (ref 150–450)
PMV BLD AUTO: 9.6 FL (ref 9.2–12.9)
POTASSIUM SERPL-SCNC: 4.1 MMOL/L (ref 3.5–5.1)
PROT SERPL-MCNC: 8.3 G/DL (ref 6–8.4)
PROT UR QL STRIP: ABNORMAL
RBC # BLD AUTO: 4.52 M/UL (ref 4–5.4)
RBC #/AREA URNS HPF: 19 /HPF (ref 0–4)
SODIUM SERPL-SCNC: 136 MMOL/L (ref 136–145)
SP GR UR STRIP: 1.02 (ref 1–1.03)
SPECIMEN SOURCE: NORMAL
SQUAMOUS #/AREA URNS HPF: 8 /HPF
TOXICOLOGY INFORMATION: NORMAL
TROPONIN I SERPL HS-MCNC: <2.3 PG/ML (ref 0–14.9)
TSH SERPL DL<=0.005 MIU/L-ACNC: 1.43 UIU/ML (ref 0.34–5.6)
URN SPEC COLLECT METH UR: ABNORMAL
UROBILINOGEN UR STRIP-ACNC: NEGATIVE EU/DL
WBC # BLD AUTO: 6.49 K/UL (ref 3.9–12.7)
WBC #/AREA URNS HPF: 12 /HPF (ref 0–5)

## 2023-01-16 PROCEDURE — 93005 ELECTROCARDIOGRAM TRACING: CPT | Performed by: SPECIALIST

## 2023-01-16 PROCEDURE — 63600175 PHARM REV CODE 636 W HCPCS: Performed by: NURSE PRACTITIONER

## 2023-01-16 PROCEDURE — 87502 INFLUENZA DNA AMP PROBE: CPT | Performed by: NURSE PRACTITIONER

## 2023-01-16 PROCEDURE — 84443 ASSAY THYROID STIM HORMONE: CPT | Performed by: NURSE PRACTITIONER

## 2023-01-16 PROCEDURE — 81025 URINE PREGNANCY TEST: CPT | Performed by: NURSE PRACTITIONER

## 2023-01-16 PROCEDURE — 93010 ELECTROCARDIOGRAM REPORT: CPT | Mod: ,,, | Performed by: SPECIALIST

## 2023-01-16 PROCEDURE — 96374 THER/PROPH/DIAG INJ IV PUSH: CPT

## 2023-01-16 PROCEDURE — 87086 URINE CULTURE/COLONY COUNT: CPT | Performed by: NURSE PRACTITIONER

## 2023-01-16 PROCEDURE — 96375 TX/PRO/DX INJ NEW DRUG ADDON: CPT

## 2023-01-16 PROCEDURE — 84484 ASSAY OF TROPONIN QUANT: CPT | Performed by: NURSE PRACTITIONER

## 2023-01-16 PROCEDURE — 96361 HYDRATE IV INFUSION ADD-ON: CPT

## 2023-01-16 PROCEDURE — 80307 DRUG TEST PRSMV CHEM ANLYZR: CPT | Performed by: NURSE PRACTITIONER

## 2023-01-16 PROCEDURE — 83690 ASSAY OF LIPASE: CPT | Performed by: NURSE PRACTITIONER

## 2023-01-16 PROCEDURE — 85025 COMPLETE CBC W/AUTO DIFF WBC: CPT | Performed by: NURSE PRACTITIONER

## 2023-01-16 PROCEDURE — 87186 SC STD MICRODIL/AGAR DIL: CPT | Performed by: NURSE PRACTITIONER

## 2023-01-16 PROCEDURE — 63600175 PHARM REV CODE 636 W HCPCS: Performed by: EMERGENCY MEDICINE

## 2023-01-16 PROCEDURE — 93010 EKG 12-LEAD: ICD-10-PCS | Mod: ,,, | Performed by: SPECIALIST

## 2023-01-16 PROCEDURE — 81001 URINALYSIS AUTO W/SCOPE: CPT | Mod: 59 | Performed by: NURSE PRACTITIONER

## 2023-01-16 PROCEDURE — 99285 EMERGENCY DEPT VISIT HI MDM: CPT | Mod: 25

## 2023-01-16 PROCEDURE — 80053 COMPREHEN METABOLIC PANEL: CPT | Performed by: NURSE PRACTITIONER

## 2023-01-16 PROCEDURE — 82550 ASSAY OF CK (CPK): CPT | Performed by: NURSE PRACTITIONER

## 2023-01-16 PROCEDURE — 25000003 PHARM REV CODE 250: Performed by: NURSE PRACTITIONER

## 2023-01-16 PROCEDURE — 82962 GLUCOSE BLOOD TEST: CPT

## 2023-01-16 PROCEDURE — 87077 CULTURE AEROBIC IDENTIFY: CPT | Performed by: NURSE PRACTITIONER

## 2023-01-16 RX ORDER — DIPHENHYDRAMINE HYDROCHLORIDE 50 MG/ML
12.5 INJECTION INTRAMUSCULAR; INTRAVENOUS
Status: COMPLETED | OUTPATIENT
Start: 2023-01-16 | End: 2023-01-16

## 2023-01-16 RX ORDER — PROCHLORPERAZINE EDISYLATE 5 MG/ML
10 INJECTION INTRAMUSCULAR; INTRAVENOUS
Status: COMPLETED | OUTPATIENT
Start: 2023-01-16 | End: 2023-01-16

## 2023-01-16 RX ORDER — ONDANSETRON 2 MG/ML
4 INJECTION INTRAMUSCULAR; INTRAVENOUS
Status: COMPLETED | OUTPATIENT
Start: 2023-01-16 | End: 2023-01-16

## 2023-01-16 RX ADMIN — PROCHLORPERAZINE EDISYLATE 10 MG: 5 INJECTION INTRAMUSCULAR; INTRAVENOUS at 08:01

## 2023-01-16 RX ADMIN — ONDANSETRON 4 MG: 2 INJECTION INTRAMUSCULAR; INTRAVENOUS at 08:01

## 2023-01-16 RX ADMIN — DIPHENHYDRAMINE HYDROCHLORIDE 12.5 MG: 50 INJECTION INTRAMUSCULAR; INTRAVENOUS at 08:01

## 2023-01-16 RX ADMIN — SODIUM CHLORIDE 1000 ML: 0.9 INJECTION, SOLUTION INTRAVENOUS at 08:01

## 2023-01-17 NOTE — PROGRESS NOTES
Patient noted to have Gram-negative rods on urine culture she was not discharged home with any antibiotics please call in Ceftin 500 mg patient's take 1 tablet twice daily for the next 7 days

## 2023-01-17 NOTE — FIRST PROVIDER EVALUATION
"Medical screening examination initiated.  I have conducted a focused provider triage encounter, findings are as follows:    Brief history of present illness:  42-year-old female with no previous medical history presents to the ER with a family member for evaluation due to new onset dizziness, headache, lightheadedness, feeling like she might pass out, multiple episodes of nausea and vomiting and unable to tolerate eating or drinking since around 6:00 p.m. yesterday evening.  She states she is too weak and also dizzy to be able to walk straight.  She is feels unsteady on her feet.  She states prior to onset of symptoms she went to a sauna where she sat in for some time also then proceeded after that to go to a cold pool where she soaked in a very cold pull for sometime.  She states her headache is mild but mostly as dizziness and also lightheadedness.  She currently states she feels like she could pass out.  She denies any black or bloody stools.  No recent fevers.  No cough no shortness of breath or chest pain.     Vitals:    01/16/23 1830   BP: 119/82   BP Location: Left arm   Patient Position: Sitting   Pulse: 66   Resp: 16   Temp: 98.3 °F (36.8 °C)   TempSrc: Oral   SpO2: 100%   Weight: 60.3 kg (133 lb)   Height: 5' 4" (1.626 m)       Pertinent physical exam:  appears slightly pallor, currently AAOx3, appears sickly but not toxic. Presents in wheelchair.     Brief workup plan:  see orders placed.     Preliminary workup initiated; this workup will be continued and followed by the physician or advanced practice provider that is assigned to the patient when roomed.  "

## 2023-01-18 LAB — BACTERIA UR CULT: ABNORMAL

## 2023-01-18 NOTE — ED PROVIDER NOTES
"Encounter Date: 1/16/2023       History     Chief Complaint   Patient presents with    Dizziness     X 1 DAY, WORSE WITH MOVT    Headache     HPI     Seen evaluated.  Presented with a chief complaint of vertigo.  She reported vertiginous symptoms causing significant discomfort and felt as though the room was spinning.  She denies any alleviating factors.  She had associated nausea.  Symptoms are moderate persistent ongoing.  They have been ongoing for 1 day.  Of course of.  She denies any alleviating factors.  She has not found anything that helps.  No associated chest pain or shortness of breath.  Did have associated nausea.  No associated fever chills this is an acute episodic process that is moderate to severe persistent and ongoing.  Additionally, patient noted that symptoms began after an episodic bout where she was in both a hot sauna and a cold it.  She has not been able to eat for approximately 1 day.  This started yesterday.      Review of patient's allergies indicates:  No Known Allergies  Past Medical History:   Diagnosis Date    Anemia     "life long"    Hypothyroidism     Unspecified hemorrhoids     Unspecified hemorrhoids      Past Surgical History:   Procedure Laterality Date    TUBAL LIGATION       Family History   Problem Relation Age of Onset    Hyperlipidemia Mother     Hyperlipidemia Father     Lung cancer Maternal Uncle     Breast cancer Cousin     Colon cancer Neg Hx     Diabetes Neg Hx     Hypertension Neg Hx     Ovarian cancer Neg Hx     Stroke Neg Hx      Social History     Tobacco Use    Smoking status: Never    Smokeless tobacco: Never   Substance Use Topics    Alcohol use: Yes     Comment: socially    Drug use: Never     Review of Systems   Constitutional:  Positive for fatigue. Negative for fever.   HENT:  Negative for sore throat.    Respiratory:  Negative for shortness of breath.    Cardiovascular:  Negative for chest pain.   Gastrointestinal:  Positive for nausea.   Genitourinary:  " Negative for dysuria.   Skin:  Negative for rash.   Neurological:  Positive for dizziness. Negative for weakness.   Psychiatric/Behavioral:  Negative for confusion.    All other systems reviewed and are negative.    Physical Exam     Initial Vitals [01/16/23 1830]   BP Pulse Resp Temp SpO2   119/82 66 16 98.3 °F (36.8 °C) 100 %      MAP       --         Physical Exam    Nursing note and vitals reviewed.  Constitutional: She appears well-developed and well-nourished.   HENT:   Head: Normocephalic and atraumatic.   Eyes: Conjunctivae are normal.   Neck: Neck supple.   Cardiovascular:  Normal rate and regular rhythm.           Abdominal: Abdomen is soft.   Musculoskeletal:         General: Normal range of motion.      Cervical back: Neck supple.     Neurological: She is alert and oriented to person, place, and time.     Initially with gait instability is patient felt dizzy/vertiginous with a sensation of the room spinning   Skin: Skin is warm and dry.   Psychiatric: She has a normal mood and affect. Her speech is normal.       ED Course   Procedures  Labs Reviewed   CULTURE, URINE - Abnormal; Notable for the following components:       Result Value    Urine Culture, Routine   (*)     Value: GRAM NEGATIVE MIKE, LACTOSE   >100,000 cfu/ml  Identification and susceptibility pending      All other components within normal limits    Narrative:     Specimen Source->Urine   CBC W/ AUTO DIFFERENTIAL - Abnormal; Notable for the following components:    Hemoglobin 11.7 (*)     MCH 25.9 (*)     MCHC 31.4 (*)     Gran % 73.4 (*)     Mono % 3.9 (*)     All other components within normal limits   COMPREHENSIVE METABOLIC PANEL - Abnormal; Notable for the following components:    Alkaline Phosphatase 41 (*)     All other components within normal limits   URINALYSIS, REFLEX TO URINE CULTURE - Abnormal; Notable for the following components:    Protein, UA Trace (*)     Ketones, UA 1+ (*)     Occult Blood UA 2+ (*)     Leukocytes,  UA 1+ (*)     All other components within normal limits    Narrative:     Specimen Source->Urine   URINALYSIS MICROSCOPIC - Abnormal; Notable for the following components:    RBC, UA 19 (*)     WBC, UA 12 (*)     Hyaline Casts, UA 16 (*)     All other components within normal limits    Narrative:     Specimen Source->Urine   POCT GLUCOSE - Abnormal; Notable for the following components:    POC Glucose 113 (*)     All other components within normal limits   DRUG SCREEN PANEL, URINE EMERGENCY    Narrative:     Specimen Source->Urine   LIPASE   INFLUENZA A AND B ANTIGEN    Narrative:     Specimen Source->Nasopharyngeal Swab   TROPONIN I HIGH SENSITIVITY   TSH   CK   POCT URINE PREGNANCY   POCT GLUCOSE     EKG Readings: (Independently Interpreted)    Sinus bradycardia 59 beats per minute no ST elevation myocardial infarction, no ST depressions, normal axis normal intervals   ECG Results              EKG 12-lead (In process)  Result time 01/17/23 08:56:00      In process by Interface, Lab In [facility] (01/17/23 08:56:00)                   Narrative:    Test Reason : R55,    Vent. Rate : 059 BPM     Atrial Rate : 059 BPM     P-R Int : 120 ms          QRS Dur : 094 ms      QT Int : 458 ms       P-R-T Axes : 042 031 042 degrees     QTc Int : 453 ms    Sinus bradycardia  Otherwise normal ECG  When compared with ECG of 11-JUL-2022 08:07,  No significant change was found    Referred By: [email or web address]   SELF           Confirmed By:                                   Imaging Results              CT Head Without Contrast (Final result)  Result time 01/16/23 20:46:56      Final result by Jerry De Jesus MD (01/16/23 20:46:56)                   Narrative:    HISTORY:  Dizziness, persistent/recurrent, cardiac or vascular cause suspected    TECHNIQUE:  Axial CT scan images of the brain obtained with sagittal and coronal reconstructions. Dose reduction techniques were employed including smart MA, use of patient weight and/or use of noise  index.    COMPARISON:  CT scan of the brain dated September 5, 2015    FINDINGS:  There is no evidence of acute intracranial hemorrhage or definite cortical infarction.    The ventricles, cisterns and sulci are within normal limits.  No hydrocephalus. No midline shift or extra-axial fluid collection.  Basilar cisterns are patent.   The proximal M1 segments of the MCA's show no definite asymmetric hyperdensity.    No skull fracture.    Mild mucosal thickening in the paranasal sinuses noted without air-fluid level. Mastoid air cells are clear.    IMPRESSION:    No acute intracranial finding or significant interval change.    Electronically signed by:  Jerry De Jesus MD  1/16/2023 8:46 PM CST Workstation: [id number]                                     X-Ray Chest 1 View (Final result)  Result time 01/16/23 19:35:54      Final result by Giovanni Ramirez MD (01/16/23 19:35:54)                   Narrative:    HISTORY: Near syncope.    FINDINGS: Portable chest radiograph at 1856 hours compared to 07/11/2022 shows the cardiomediastinal silhouette and pulmonary vasculature are within normal limits.    The lungs are normally expanded, with no consolidation, large pleural effusion, or evidence of pulmonary edema. No confluent infiltrates or pneumothorax. There are no significant osseous abnormalities.    IMPRESSION: No evidence of active cardiopulmonary disease.    Electronically signed by:  Giovanni Ramirez MD  1/16/2023 7:35 PM CST Workstation: [id number]                                     Medications   sodium chloride 0.9% bolus 1,000 mL 1,000 mL (0 mLs Intravenous Stopped 1/16/23 2150)   ondansetron injection 4 mg (4 mg Intravenous Given 1/16/23 2054)   prochlorperazine injection Soln 10 mg (10 mg Intravenous Given 1/16/23 2051)   diphenhydrAMINE injection 12.5 mg (12.5 mg Intravenous Given 1/16/23 2052)     Medical Decision Making:   Initial Assessment:    Seen and evaluated.  Presented with chief complaint of dizziness.     Initial concern for cerebellar process verses BPPV.  I have considered COVID as well as influenza.  Laboratory evaluation is stable.  Patient was treated with a dose of Zofran   For nausea, and Compazine and Benadryl for the vertiginous symptoms.  Ultimately following treatment including a bolus of IV fluids.  She felt significantly improved was able to ambulate without deficit and we agreed to attempt outpatient management.  Of note, patient had a urinalysis that showed 1+ leuk esterase and white cells but was negative for bacteria.  A urine culture was sent.   Patient had no fevers.  Laboratory evaluation showed normal white blood cell count, stable TSH, this was ordered as patient reported history of thyroid dysfunction.  Stable H&H, and no significant electrolyte abnormality , and a stable troponin.  Symptoms improved significantly I do not think this was a CVA in origin.  I think this likely represented benign positional vertigo.  Unclear of the etiology.    I attempted to call patient for follow-up this morning.  I was unable to contact the patient.   Urine culture resulted with lactose fermenting Gram-negative rods likely E coli.  Patient had Ceftin called in after review of but board by ED team.    Arnold Arevalo MD MPH  01/17/2023 9:24 PM                              Clinical Impression:   Final diagnoses:  [R55] Near syncope  [R42] Dizziness (Primary)  [R42] Vertigo  [R51.9] Nonintractable headache, unspecified chronicity pattern, unspecified headache type        ED Disposition Condition    Discharge Stable          ED Prescriptions    None       Follow-up Information       Follow up With Specialties Details Why Contact Info Additional Information    Cristobal Sierra MD Family Medicine   [address] [facility] [address]  [phone]       [facility] - Emergency Dept Emergency Medicine   [address] [facility] [facility] [id number] [phone] 1st floor             Arnold Arevalo Jr.,  MD  01/17/23 5062

## 2023-02-17 DIAGNOSIS — Z00.00 ANNUAL PHYSICAL EXAM: Primary | ICD-10-CM

## 2023-03-17 ENCOUNTER — OFFICE VISIT (OUTPATIENT)
Dept: FAMILY MEDICINE | Facility: CLINIC | Age: 43
End: 2023-03-17
Payer: COMMERCIAL

## 2023-03-17 ENCOUNTER — CLINICAL SUPPORT (OUTPATIENT)
Dept: CARDIOLOGY | Facility: HOSPITAL | Age: 43
End: 2023-03-17
Attending: FAMILY MEDICINE
Payer: COMMERCIAL

## 2023-03-17 ENCOUNTER — HOSPITAL ENCOUNTER (OUTPATIENT)
Dept: RADIOLOGY | Facility: HOSPITAL | Age: 43
Discharge: HOME | End: 2023-03-17
Attending: FAMILY MEDICINE
Payer: COMMERCIAL

## 2023-03-17 ENCOUNTER — CLINICAL SUPPORT (OUTPATIENT)
Dept: INTERNAL MEDICINE | Facility: CLINIC | Age: 43
End: 2023-03-17
Payer: COMMERCIAL

## 2023-03-17 VITALS
DIASTOLIC BLOOD PRESSURE: 60 MMHG | BODY MASS INDEX: 23.14 KG/M2 | WEIGHT: 135.56 LBS | HEART RATE: 72 BPM | OXYGEN SATURATION: 97 % | SYSTOLIC BLOOD PRESSURE: 104 MMHG | HEIGHT: 64 IN

## 2023-03-17 DIAGNOSIS — E03.9 ACQUIRED HYPOTHYROIDISM: ICD-10-CM

## 2023-03-17 DIAGNOSIS — Z00.00 WELLNESS EXAMINATION: Primary | ICD-10-CM

## 2023-03-17 DIAGNOSIS — Z00.00 ANNUAL PHYSICAL EXAM: ICD-10-CM

## 2023-03-17 DIAGNOSIS — D50.8 IRON DEFICIENCY ANEMIA SECONDARY TO INADEQUATE DIETARY IRON INTAKE: ICD-10-CM

## 2023-03-17 DIAGNOSIS — Z12.31 BREAST CANCER SCREENING BY MAMMOGRAM: ICD-10-CM

## 2023-03-17 DIAGNOSIS — Z00.00 ANNUAL PHYSICAL EXAM: Primary | ICD-10-CM

## 2023-03-17 LAB
ALBUMIN SERPL BCP-MCNC: 4.4 G/DL (ref 3.5–5.2)
ALP SERPL-CCNC: 39 U/L (ref 55–135)
ALT SERPL W/O P-5'-P-CCNC: 12 U/L (ref 10–44)
ANION GAP SERPL CALC-SCNC: 10 MMOL/L (ref 8–16)
AST SERPL-CCNC: 15 U/L (ref 10–40)
BILIRUB SERPL-MCNC: 0.2 MG/DL (ref 0.1–1)
BILIRUB UR QL STRIP: NEGATIVE
BUN SERPL-MCNC: 21 MG/DL (ref 6–20)
CALCIUM SERPL-MCNC: 9.5 MG/DL (ref 8.7–10.5)
CHLORIDE SERPL-SCNC: 102 MMOL/L (ref 95–110)
CHOLEST SERPL-MCNC: 241 MG/DL (ref 120–199)
CHOLEST/HDLC SERPL: 4.5 {RATIO} (ref 2–5)
CLARITY UR: CLEAR
CO2 SERPL-SCNC: 23 MMOL/L (ref 23–29)
COLOR UR: YELLOW
CREAT SERPL-MCNC: 0.8 MG/DL (ref 0.5–1.4)
ERYTHROCYTE [DISTWIDTH] IN BLOOD BY AUTOMATED COUNT: 14.6 % (ref 11.5–14.5)
EST. GFR  (NO RACE VARIABLE): >60 ML/MIN/1.73 M^2
ESTIMATED AVG GLUCOSE: 105 MG/DL (ref 68–131)
FERRITIN SERPL-MCNC: 8 NG/ML (ref 20–300)
GLUCOSE SERPL-MCNC: 89 MG/DL (ref 70–110)
GLUCOSE UR QL STRIP: NEGATIVE
HBA1C MFR BLD: 5.3 % (ref 4–5.6)
HCT VFR BLD AUTO: 33.6 % (ref 37–48.5)
HDLC SERPL-MCNC: 53 MG/DL (ref 40–75)
HDLC SERPL: 22 % (ref 20–50)
HGB BLD-MCNC: 10.8 G/DL (ref 12–16)
HGB UR QL STRIP: NEGATIVE
IRON SERPL-MCNC: 27 UG/DL (ref 30–160)
KETONES UR QL STRIP: NEGATIVE
LDLC SERPL CALC-MCNC: 159.8 MG/DL (ref 63–159)
LEUKOCYTE ESTERASE UR QL STRIP: NEGATIVE
MCH RBC QN AUTO: 26 PG (ref 27–31)
MCHC RBC AUTO-ENTMCNC: 32.1 G/DL (ref 32–36)
MCV RBC AUTO: 81 FL (ref 82–98)
NITRITE UR QL STRIP: NEGATIVE
NONHDLC SERPL-MCNC: 188 MG/DL
PH UR STRIP: 8 [PH] (ref 5–8)
PLATELET # BLD AUTO: 240 K/UL (ref 150–450)
PMV BLD AUTO: 9.4 FL (ref 9.2–12.9)
POTASSIUM SERPL-SCNC: 4 MMOL/L (ref 3.5–5.1)
PROT SERPL-MCNC: 7.6 G/DL (ref 6–8.4)
PROT UR QL STRIP: NEGATIVE
RBC # BLD AUTO: 4.15 M/UL (ref 4–5.4)
SATURATED IRON: 6 % (ref 20–50)
SODIUM SERPL-SCNC: 135 MMOL/L (ref 136–145)
SP GR UR STRIP: <=1.005 (ref 1–1.03)
T4 FREE SERPL-MCNC: 0.83 NG/DL (ref 0.71–1.51)
TOTAL IRON BINDING CAPACITY: 441 UG/DL (ref 250–450)
TRANSFERRIN SERPL-MCNC: 298 MG/DL (ref 200–375)
TRIGL SERPL-MCNC: 141 MG/DL (ref 30–150)
TSH SERPL DL<=0.005 MIU/L-ACNC: 3.4 UIU/ML (ref 0.4–4)
URN SPEC COLLECT METH UR: ABNORMAL
WBC # BLD AUTO: 6.09 K/UL (ref 3.9–12.7)

## 2023-03-17 PROCEDURE — 93005 ELECTROCARDIOGRAM TRACING: CPT | Mod: PO

## 2023-03-17 PROCEDURE — 80053 COMPREHEN METABOLIC PANEL: CPT | Mod: PO | Performed by: FAMILY MEDICINE

## 2023-03-17 PROCEDURE — 84466 ASSAY OF TRANSFERRIN: CPT | Performed by: FAMILY MEDICINE

## 2023-03-17 PROCEDURE — 93010 ELECTROCARDIOGRAM REPORT: CPT | Mod: ,,, | Performed by: INTERNAL MEDICINE

## 2023-03-17 PROCEDURE — 85027 COMPLETE CBC AUTOMATED: CPT | Mod: PO | Performed by: FAMILY MEDICINE

## 2023-03-17 PROCEDURE — 84439 ASSAY OF FREE THYROXINE: CPT | Performed by: FAMILY MEDICINE

## 2023-03-17 PROCEDURE — 93010 EKG 12-LEAD: ICD-10-PCS | Mod: ,,, | Performed by: INTERNAL MEDICINE

## 2023-03-17 PROCEDURE — 84443 ASSAY THYROID STIM HORMONE: CPT | Performed by: FAMILY MEDICINE

## 2023-03-17 PROCEDURE — 83036 HEMOGLOBIN GLYCOSYLATED A1C: CPT | Performed by: FAMILY MEDICINE

## 2023-03-17 PROCEDURE — 81003 URINALYSIS AUTO W/O SCOPE: CPT | Mod: PO | Performed by: FAMILY MEDICINE

## 2023-03-17 PROCEDURE — 80061 LIPID PANEL: CPT | Performed by: FAMILY MEDICINE

## 2023-03-17 PROCEDURE — 82728 ASSAY OF FERRITIN: CPT | Performed by: FAMILY MEDICINE

## 2023-03-17 PROCEDURE — 99396 PR PREVENTIVE VISIT,EST,40-64: ICD-10-PCS | Mod: S$GLB,,, | Performed by: FAMILY MEDICINE

## 2023-03-17 PROCEDURE — 99999 PR PBB SHADOW E&M-EST. PATIENT-LVL III: CPT | Mod: PBBFAC,,, | Performed by: FAMILY MEDICINE

## 2023-03-17 PROCEDURE — 99999 PR PBB SHADOW E&M-EST. PATIENT-LVL III: ICD-10-PCS | Mod: PBBFAC,,, | Performed by: FAMILY MEDICINE

## 2023-03-17 PROCEDURE — 99396 PREV VISIT EST AGE 40-64: CPT | Mod: S$GLB,,, | Performed by: FAMILY MEDICINE

## 2023-03-17 NOTE — PROGRESS NOTES
"March 17, 2023                                                                                                                                                                                                                                                                                      Manuela Quinones  [address]  [place] [address]                                                                                                                                                                                                                                                                                                RE: Manuela Quinones                                                        Clinic #:[id number]                                                                                                                                   Dear  Manuela Quinones,                                                                                                                                           Thank you for allowing me to serve you and perform your Executive Health exam on March 17, 2023.   This letter will serve a brief summary of the history, physical findings, and laboratory/studies performed and recommendations at that time.                                                                                         REASON FOR VISIT: Executive Health Preventive Physical Examination    Past Medical History:   Diagnosis Date    Anemia     "life long"    Hypothyroidism     Unspecified hemorrhoids     Unspecified hemorrhoids        Past Surgical History:   Procedure Laterality Date    TUBAL LIGATION         Family History   Problem Relation Age of Onset    Hyperlipidemia Mother     Hyperlipidemia Father     Lung cancer Maternal Uncle     Breast cancer Cousin     Colon cancer Neg Hx     Diabetes Neg Hx     Hypertension Neg Hx     Ovarian cancer Neg Hx     Stroke Neg Hx        Social History     Socioeconomic History " "   Marital status:     Number of children: 3   Tobacco Use    Smoking status: Never    Smokeless tobacco: Never   Substance and Sexual Activity    Alcohol use: Yes     Comment: socially    Drug use: Never    Sexual activity: Yes     Partners: Male     Birth control/protection: See Surgical Hx       Allergies: Patient has no known allergies.    No current outpatient medications on file.     No current facility-administered medications for this visit.       REVIEW OF SYSTEMS:  No recent changes in weight, or complaints of fatigue. No recent changes in vision, or hearing. Denies frequent headaches.No recent changes in voice. No new or changing skin lesions. Denies abnormal bruising or bleeding.  Denies chest pain or sensation of skipped beats. No new onset of shortness of breath, or dyspnea on exertion. Denies abdominal discomfort, constipation, diarrhea,or blood in stool. Denies difficulty with urination.   No recent joint swelling or muscle discomfort. Denies pain or weakness in extremeties. No recent loss of balance. Denies problems with sleep or depression.        Remainder of the review of systems without pertinent positves at this time.                                                                              PHYSICAL EXAM:   VITAL SIGNS: /60   Pulse 72   Ht 5' 4" (1.626 m)   Wt 61.5 kg (135 lb 9.3 oz)   LMP 02/27/2023 (Approximate)   SpO2 97%   BMI 23.27 kg/m²   GENERAL APPEARANCE:  Well nourished and normally developed,  pleasant 42 y.o. female, in good spirits.  SKIN: Without rashes or overt lesions.  HEENT: Head normacephalic. There was no scleral icterus. Mucous membranes were moist. Dentition. Neck is supple, no thyromegally, or carotid bruits.  LUNGS: Clear to auscultation bilaterally. Normal respiratory effort.  HEART: Exam reveals regular rate and rhythm. First and second heart sounds normal. No murmurs, rubs or gallops.   ABDOMEN: Soft, non-tender, non-distended. Exam reveals " normal bowl sounds, no masses, no organomegaly and no aortic enlargement.    EXTREMITIES:  Nonedematous, both femoral and pedal pulses are normal. No joint stiffness or tenderness. Full range of motion and strength, upper and lower bilaterally.      ASSESSMENT/RECOMMENDATIONS :  Wellness exam    At this time,  you appear to be in good medical condition.    Continue to work on regular exercise, maintenance of a healthy weight, balanced diet rich in fruits/vegetables and lean protein, and avoidance of unhealthy habits like smoking and excessive alcohol intake.  I look forward to seeing you again next year.    Please contact me should you have any questions or concerns regarding physical findings, or my recommendations.       Sincerely yours,        ELIZABETH Cohen M.D.  Department of Family Practice  [facility]

## 2023-06-12 ENCOUNTER — TELEPHONE (OUTPATIENT)
Dept: SURGERY | Facility: CLINIC | Age: 43
End: 2023-06-12
Payer: COMMERCIAL

## 2023-06-12 NOTE — TELEPHONE ENCOUNTER
----- Message from Audra Poon sent at 6/12/2023  8:52 AM CDT -----  Regarding: Pt Advice / Apt  Contact:  [phone]  Pt called would like an apt with the provider, Pt stated her Dx: Hemorrhoids . No Dates In Epic. Please Call      09-Oct-2022

## 2023-06-12 NOTE — TELEPHONE ENCOUNTER
Talked to patient, offered appointment with NP but patient declined. Scheduled with Dr. Dow 07/12 at [facility], patient will refer to portal for appointment details.

## 2023-07-10 NOTE — PROGRESS NOTES
"CRS Office Visit History and Physical      SUBJECTIVE:     Chief Complaint: Hemorrhoids    History of Present Illness:  The patient is new patient to this practice.   Course is as follows:  Patient is a 43 y.o. female presents with hemorrhoids. Symptoms are pain and bleeding. Worse over the last few months. Worse after BM.  Symptoms have been present for 7 years.  Has tried topical hemorrhoid cream. Some help with these.  Associated bleeding: yes  Previous anorectal procedures: No  Spends 10-20 on toilet to have a BM.   Tried powder fiber before ([employer]), started flax seed 2 weeks ago.  Blood thinners: No  Last Colonoscopy: None  Family history of colorectal cancer or IBD: None.    Review of patient's allergies indicates:  No Known Allergies    Past Medical History:   Diagnosis Date    Anemia     "life long"    Hypothyroidism     Unspecified hemorrhoids     Unspecified hemorrhoids      Past Surgical History:   Procedure Laterality Date    TUBAL LIGATION       Family History   Problem Relation Age of Onset    Hyperlipidemia Mother     Hyperlipidemia Father     Lung cancer Maternal Uncle     Breast cancer Cousin     Colon cancer Neg Hx     Diabetes Neg Hx     Hypertension Neg Hx     Ovarian cancer Neg Hx     Stroke Neg Hx      Social History     Tobacco Use    Smoking status: Never    Smokeless tobacco: Never   Substance Use Topics    Alcohol use: Yes     Comment: socially    Drug use: Never        Review of Systems:  ROS    OBJECTIVE:     Vital Signs (Most Recent)  /73 (BP Location: Left arm, Patient Position: Sitting, BP Method: Small (Manual))   Pulse 75   Resp 19   Ht 5' 4" (1.626 m)   Wt 61.2 kg (134 lb 14.7 oz)   SpO2 96%   BMI 23.16 kg/m²     Physical Exam:  General: White female in no distress   Neuro: Alert and oriented x 4.  Moves all extremities.     HEENT: No icterus.  Trachea midline  Respiratory: Respirations are even and unlabored  Cardiac: Regular rate  Extremities: Warm dry and " intact  Skin: No rashes    Anorectal:   External exam: Perianal skin with skin tag/chronically prolapsed column in right anterior, no fissure, skin healthy  Digital exam revealed normal tone. No masses, no blood    ASSESSMENT/PLAN:     42yo F w/ symptomatic hemorrhoids    The patient was instructed to:  Increase water intake to at least 8-10 glasses of water per day.  Take a daily fiber supplement (Konsyl, Benefiber, Metamucil) and increase dietary intake to 20-30 grams/day.  Avoid straining or spending >5min/event with bowel movements.  Schedule colonoscopy with banding if not improved with above    Prabha Dow MD  Staff Surgeon, Colon and Rectal Surgery  [facility]

## 2023-07-11 ENCOUNTER — TELEPHONE (OUTPATIENT)
Dept: SURGERY | Facility: CLINIC | Age: 43
End: 2023-07-11
Payer: COMMERCIAL

## 2023-07-12 ENCOUNTER — OFFICE VISIT (OUTPATIENT)
Dept: SURGERY | Facility: CLINIC | Age: 43
End: 2023-07-12
Payer: COMMERCIAL

## 2023-07-12 VITALS
DIASTOLIC BLOOD PRESSURE: 73 MMHG | HEIGHT: 64 IN | RESPIRATION RATE: 19 BRPM | SYSTOLIC BLOOD PRESSURE: 112 MMHG | OXYGEN SATURATION: 96 % | BODY MASS INDEX: 23.04 KG/M2 | HEART RATE: 75 BPM | WEIGHT: 134.94 LBS

## 2023-07-12 DIAGNOSIS — K64.9 HEMORRHOIDS, UNSPECIFIED HEMORRHOID TYPE: Primary | ICD-10-CM

## 2023-07-12 PROCEDURE — 99203 PR OFFICE/OUTPT VISIT, NEW, LEVL III, 30-44 MIN: ICD-10-PCS | Mod: S$GLB,,, | Performed by: COLON & RECTAL SURGERY

## 2023-07-12 PROCEDURE — 99203 OFFICE O/P NEW LOW 30 MIN: CPT | Mod: S$GLB,,, | Performed by: COLON & RECTAL SURGERY

## 2023-07-12 PROCEDURE — 99999 PR PBB SHADOW E&M-EST. PATIENT-LVL IV: CPT | Mod: PBBFAC,,, | Performed by: COLON & RECTAL SURGERY

## 2023-07-12 PROCEDURE — 99999 PR PBB SHADOW E&M-EST. PATIENT-LVL IV: ICD-10-PCS | Mod: PBBFAC,,, | Performed by: COLON & RECTAL SURGERY

## 2023-07-12 RX ORDER — FERROUS SULFATE 325(65) MG
325 TABLET ORAL
COMMUNITY

## 2023-07-12 NOTE — PATIENT INSTRUCTIONS
AVOID sitting on toilet for >5 minutes at a time.  Do not read or use your phone on the toilet  Drink at least 8 glasses of water per day  Get at least 30min of exercise three times per day    Start a daily fiber supplement:  [facility]  High Fiber Diet    25 grams of fiber per day is recommended  Fiber cereal = 5 grams (Raisin Bran, Shredded Wheat, Grape Nuts)  Konsyl 1 teaspoon = 6 grams  Metamucil 1 tablespoon = 3 grams  Citrucel 1 tablespoon = 2 grams  Fiber Choice = 3-4 per day    This diet furnishes adequate amounts of all the essential nutrients needed by the body and a very liberal fiber or roughage content. Roughage is indigestible fiber found in fruits, vegetables and whole grain cereals. It provides bulk to the large intestine and, accompanied by an adequate fluid intake, is a stimulant to elimination. Regular eating and elimination habits are vital to good health.     How to Increase Your Fiber Intake    Drink at least 4-5 glasses of liquids per day or the fiber can be constipating rather then stimulating to your gut.  Boil and bake potatoes in their skin. Eat the skin, too.  Include fresh fruits and raw vegetables in your daily diet. Raw fruits and vegetables have more useful fiber than those that have been peeled, cooked, pureed, or otherwise processed.  Eat a wide variety of fibrous foods in reasonable amounts. Increase fiber intake slowly especially if you have been on a low-fiber diet.  Eat more legumes-peas, beans, soybeans.  For snacks, try dried fruit, whole wheat and rye crackers.  Avoid instant-cook hot cereals. Use the longer cooking cereals.  Use bran whenever possible. Sprinkle it on top of cereal, mix it into mashed potatoes or hamburger meat, or use it in combination dishes such as meat loaf.   Substitute whole grain, whole wheat and bran products for white flour products.  Eat slowly and chew your food thoroughly.      If you are still straining despite the fiber, you can  start MiraLax 1 capful daily in the morning, and increase to twice per day if needed.

## 2023-07-14 ENCOUNTER — TELEPHONE (OUTPATIENT)
Dept: ENDOSCOPY | Facility: HOSPITAL | Age: 43
End: 2023-07-14
Payer: COMMERCIAL

## 2023-07-14 DIAGNOSIS — K62.5 RECTAL BLEEDING: Primary | ICD-10-CM

## 2023-07-14 RX ORDER — POLYETHYLENE GLYCOL 3350, SODIUM SULFATE ANHYDROUS, SODIUM BICARBONATE, SODIUM CHLORIDE, POTASSIUM CHLORIDE 236; 22.74; 6.74; 5.86; 2.97 G/4L; G/4L; G/4L; G/4L; G/4L
4 POWDER, FOR SOLUTION ORAL ONCE
Qty: 4000 ML | Refills: 0 | Status: SHIPPED | OUTPATIENT
Start: 2023-07-14 | End: 2023-07-14

## 2023-07-14 NOTE — TELEPHONE ENCOUNTER
Spoke to pt to schedule procedure(s) Colonoscopy       Physician to perform procedure(s) Dr. SARAH Dow  Date of Procedure (s) 8/14/23  Arrival Time 11:30 AM  Time of Procedure(s) 12:30 PM   Location of Procedure(s) [place] [address] Floor  Type of Rx Prep sent to patient: PEG  Instructions provided to patient via [facility]    Patient was informed on the following information and verbalized understanding. Screening questionnaire reviewed with patient and complete. If procedure requires anesthesia, a responsible adult needs to be present to accompany the patient home, patient cannot drive after receiving anesthesia. Appointment details are tentative, especially check-in time. Patient will receive a prep-op call 4 days prior to confirm check-in time for procedure. If applicable the patient should contact their pharmacy to verify Rx for procedure prep is ready for pick-up. Patient was advised to call the scheduling department at [phone] if pharmacy states no Rx is available. Patient was advised to call the endoscopy scheduling department if any questions or concerns arise.      [facility]oscopy Scheduling Department

## 2023-07-14 NOTE — TELEPHONE ENCOUNTER
"----- Message -----   From: Prabha Dow MD   Sent: 2023   3:40 PM CDT   To: [facility]     Procedure: Colonoscopy (possible banding)     Diagnosis: Rectal bleeding     Procedure Timin-12 weeks     *If within 4 weeks selected, please kayden as high priority*     *If greater than 12 weeks, please select "5-12 weeks" and delay sending until 2 months prior to requested date*     Provider: Myself     Location: [address]     Additional Scheduling Information: No scheduling concerns     Prep Specifications:Standard prep     Have you attached a patient to this message: yes        "

## 2023-08-14 ENCOUNTER — ANESTHESIA EVENT (OUTPATIENT)
Dept: ENDOSCOPY | Facility: HOSPITAL | Age: 43
End: 2023-08-14
Payer: COMMERCIAL

## 2023-08-14 ENCOUNTER — HOSPITAL ENCOUNTER (OUTPATIENT)
Facility: HOSPITAL | Age: 43
Discharge: HOME | End: 2023-08-14
Attending: COLON & RECTAL SURGERY | Admitting: COLON & RECTAL SURGERY
Payer: COMMERCIAL

## 2023-08-14 ENCOUNTER — ANESTHESIA (OUTPATIENT)
Dept: ENDOSCOPY | Facility: HOSPITAL | Age: 43
End: 2023-08-14
Payer: COMMERCIAL

## 2023-08-14 VITALS
HEIGHT: 64 IN | BODY MASS INDEX: 22.53 KG/M2 | HEART RATE: 64 BPM | OXYGEN SATURATION: 97 % | TEMPERATURE: 98 F | RESPIRATION RATE: 18 BRPM | SYSTOLIC BLOOD PRESSURE: 103 MMHG | DIASTOLIC BLOOD PRESSURE: 71 MMHG | WEIGHT: 132 LBS

## 2023-08-14 DIAGNOSIS — Z12.11 ENCOUNTER FOR SCREENING COLONOSCOPY FOR NON-HIGH-RISK PATIENT: Primary | ICD-10-CM

## 2023-08-14 LAB
B-HCG UR QL: NEGATIVE
CTP QC/QA: YES

## 2023-08-14 PROCEDURE — 37000008 HC ANESTHESIA 1ST 15 MINUTES: Performed by: COLON & RECTAL SURGERY

## 2023-08-14 PROCEDURE — 37000009 HC ANESTHESIA EA ADD 15 MINS: Performed by: COLON & RECTAL SURGERY

## 2023-08-14 PROCEDURE — 81025 URINE PREGNANCY TEST: CPT | Performed by: COLON & RECTAL SURGERY

## 2023-08-14 PROCEDURE — 25000003 PHARM REV CODE 250: Performed by: COLON & RECTAL SURGERY

## 2023-08-14 PROCEDURE — G0121 COLON CA SCRN NOT HI RSK IND: HCPCS | Mod: ,,, | Performed by: COLON & RECTAL SURGERY

## 2023-08-14 PROCEDURE — 27201022: Performed by: COLON & RECTAL SURGERY

## 2023-08-14 PROCEDURE — G0121 COLON CA SCRN NOT HI RSK IND: HCPCS | Performed by: COLON & RECTAL SURGERY

## 2023-08-14 PROCEDURE — E9220 PRA ENDO ANESTHESIA: ICD-10-PCS | Mod: ,,, | Performed by: NURSE ANESTHETIST, CERTIFIED REGISTERED

## 2023-08-14 PROCEDURE — 25000003 PHARM REV CODE 250: Performed by: NURSE ANESTHETIST, CERTIFIED REGISTERED

## 2023-08-14 PROCEDURE — G0121 COLON CA SCRN NOT HI RSK IND: ICD-10-PCS | Mod: ,,, | Performed by: COLON & RECTAL SURGERY

## 2023-08-14 PROCEDURE — E9220 PRA ENDO ANESTHESIA: HCPCS | Mod: ,,, | Performed by: NURSE ANESTHETIST, CERTIFIED REGISTERED

## 2023-08-14 PROCEDURE — 63600175 PHARM REV CODE 636 W HCPCS: Performed by: NURSE ANESTHETIST, CERTIFIED REGISTERED

## 2023-08-14 RX ORDER — PROPOFOL 10 MG/ML
VIAL (ML) INTRAVENOUS
Status: DISCONTINUED | OUTPATIENT
Start: 2023-08-14 | End: 2023-08-14

## 2023-08-14 RX ORDER — PROPOFOL 10 MG/ML
VIAL (ML) INTRAVENOUS CONTINUOUS PRN
Status: DISCONTINUED | OUTPATIENT
Start: 2023-08-14 | End: 2023-08-14

## 2023-08-14 RX ORDER — LIDOCAINE HYDROCHLORIDE 20 MG/ML
INJECTION INTRAVENOUS
Status: DISCONTINUED | OUTPATIENT
Start: 2023-08-14 | End: 2023-08-14

## 2023-08-14 RX ORDER — FENTANYL CITRATE 50 UG/ML
INJECTION, SOLUTION INTRAMUSCULAR; INTRAVENOUS
Status: DISCONTINUED | OUTPATIENT
Start: 2023-08-14 | End: 2023-08-14

## 2023-08-14 RX ORDER — SODIUM CHLORIDE 9 MG/ML
INJECTION, SOLUTION INTRAVENOUS CONTINUOUS
Status: DISCONTINUED | OUTPATIENT
Start: 2023-08-14 | End: 2023-08-14 | Stop reason: HOSPADM

## 2023-08-14 RX ORDER — ONDANSETRON 2 MG/ML
INJECTION INTRAMUSCULAR; INTRAVENOUS
Status: DISCONTINUED | OUTPATIENT
Start: 2023-08-14 | End: 2023-08-14

## 2023-08-14 RX ORDER — PHENYLEPHRINE HYDROCHLORIDE 10 MG/ML
INJECTION INTRAVENOUS
Status: DISCONTINUED | OUTPATIENT
Start: 2023-08-14 | End: 2023-08-14

## 2023-08-14 RX ADMIN — ONDANSETRON 4 MG: 2 INJECTION INTRAMUSCULAR; INTRAVENOUS at 01:08

## 2023-08-14 RX ADMIN — PHENYLEPHRINE HYDROCHLORIDE 50 MCG: 10 INJECTION INTRAVENOUS at 01:08

## 2023-08-14 RX ADMIN — FENTANYL CITRATE 25 MCG: 50 INJECTION, SOLUTION INTRAMUSCULAR; INTRAVENOUS at 12:08

## 2023-08-14 RX ADMIN — SODIUM CHLORIDE: 0.9 INJECTION, SOLUTION INTRAVENOUS at 12:08

## 2023-08-14 RX ADMIN — PHENYLEPHRINE HYDROCHLORIDE 50 MCG: 10 INJECTION INTRAVENOUS at 12:08

## 2023-08-14 RX ADMIN — PROPOFOL 150 MCG/KG/MIN: 10 INJECTION, EMULSION INTRAVENOUS at 12:08

## 2023-08-14 RX ADMIN — FENTANYL CITRATE 25 MCG: 50 INJECTION, SOLUTION INTRAMUSCULAR; INTRAVENOUS at 01:08

## 2023-08-14 RX ADMIN — LIDOCAINE HYDROCHLORIDE 50 MG: 20 INJECTION INTRAVENOUS at 12:08

## 2023-08-14 RX ADMIN — PROPOFOL 70 MG: 10 INJECTION, EMULSION INTRAVENOUS at 12:08

## 2023-08-14 RX ADMIN — PROPOFOL 30 MG: 10 INJECTION, EMULSION INTRAVENOUS at 12:08

## 2023-08-14 NOTE — ANESTHESIA POSTPROCEDURE EVALUATION
Anesthesia Post Evaluation    Patient: Manuela Quinones    Procedure(s) Performed: Procedure(s) (LRB):  COLONOSCOPY (N/A)    Final Anesthesia Type: general      Patient location during evaluation: GI PACU  Patient participation: Yes- Able to Participate  Level of consciousness: awake and alert  Post-procedure vital signs: reviewed and stable  Pain management: adequate  Airway patency: patent    PONV status at discharge: No PONV  Anesthetic complications: no      Cardiovascular status: blood pressure returned to baseline  Respiratory status: unassisted  Hydration status: euvolemic  Follow-up not needed.          Vitals Value Taken Time   /71 08/14/23 1326   Temp 36.6 °C (97.8 °F) 08/14/23 1312   Pulse 64 08/14/23 1326   Resp 18 08/14/23 1326   SpO2 97 % 08/14/23 1326         Event Time   Out of Recovery 13:56:59         Pain/Andres Score: Andres Score: 10 (8/14/2023  1:26 PM)

## 2023-08-14 NOTE — ANESTHESIA PREPROCEDURE EVALUATION
08/14/2023  Manuela Quinones is a 43 y.o., female.          Pre-op Assessment    I have reviewed the Patient Summary Reports.     I have reviewed the Nursing Notes. I have reviewed the NPO Status.   I have reviewed the Medications.     Review of Systems  Anesthesia Hx:  No problems with previous Anesthesia    Social:  Non-Smoker, No Alcohol Use    Hematology/Oncology:  Hematology Normal   Oncology Normal     EENT/Dental:EENT/Dental Normal   Cardiovascular:   Exercise tolerance: good NYHA Classification I    Pulmonary:  Pulmonary Normal    Renal/:  Renal/ Normal     Hepatic/GI:  Hepatic/GI Normal Rectal bleeding   Musculoskeletal:  Musculoskeletal Normal    Neurological:  Neurology Normal    Endocrine:   Hypothyroidism    Dermatological:  Skin Normal    Psych:  Psychiatric Normal           Physical Exam  General: Well nourished    Airway:  Mallampati: I / I  Mouth Opening: Normal  TM Distance: Normal  Tongue: Normal  Neck ROM: Normal ROM    Dental:  Intact        Anesthesia Plan  Type of Anesthesia, risks & benefits discussed:    Anesthesia Type: Gen Natural Airway  Intra-op Monitoring Plan: Standard ASA Monitors  Post Op Pain Control Plan: multimodal analgesia and IV/PO Opioids PRN  Induction:  IV  Airway Plan: Direct, Post-Induction  Informed Consent: Informed consent signed with the Patient and all parties understand the risks and agree with anesthesia plan.  All questions answered. Patient consented to blood products? Yes  ASA Score: 2  Day of Surgery Review of History & Physical: H&P Update referred to the surgeon/provider.    Ready For Surgery From Anesthesia Perspective.     .    Lab Results   Component Value Date    WBC 6.09 03/17/2023    HGB 10.8 (L) 03/17/2023    HCT 33.6 (L) 03/17/2023    MCV 81 (L) 03/17/2023     03/17/2023

## 2023-08-14 NOTE — TRANSFER OF CARE
"Anesthesia Transfer of Care Note    Patient: Manuela Quinones    Procedure(s) Performed: Procedure(s) (LRB):  COLONOSCOPY (N/A)    Patient location: GI    Anesthesia Type: general    Transport from OR: Transported from OR on room air with adequate spontaneous ventilation    Post pain: adequate analgesia    Post assessment: no apparent anesthetic complications and tolerated procedure well    Post vital signs: stable    Level of consciousness: awake, alert and oriented    Nausea/Vomiting: no nausea/vomiting    Complications: none    Transfer of care protocol was followed      Last vitals:   Visit Vitals  BP (!) 93/53 (BP Location: Left arm, Patient Position: Lying)   Pulse 80   Temp 36.6 °C (97.8 °F)   Resp 16   Ht 5' 4" (1.626 m)   Wt 59.9 kg (132 lb)   SpO2 99%   Breastfeeding No   BMI 22.66 kg/m²     "

## 2023-08-15 ENCOUNTER — PATIENT MESSAGE (OUTPATIENT)
Dept: SURGERY | Facility: CLINIC | Age: 43
End: 2023-08-15
Payer: COMMERCIAL

## 2023-08-17 NOTE — PROVATION PATIENT INSTRUCTIONS
Discharge Summary/Instructions after an Endoscopic Procedure  Patient Name: Manuela Quinones  Patient MRN: [id number]  Patient YOB: 1980 Monday, August 14, 2023  Prabha Dow MD  Dear patient,  As a result of recent federal legislation (The Federal Cures Act), you may   receive lab or pathology results from your procedure in your [facility]   account before your physician is able to contact you. Your physician or   their representative will relay the results to you with their   recommendations at their soonest availability.  Thank you,  RESTRICTIONS:  During your procedure today, you received medications for sedation.  These   medications may affect your judgment, balance and coordination.  Therefore,   for 24 hours, you have the following restrictions:   - DO NOT drive a car, operate machinery, make legal/financial decisions,   sign important papers or drink alcohol.    ACTIVITY:  Today: no heavy lifting, straining or running due to procedural   sedation/anesthesia.  The following day: return to full activity including work.  DIET:  Eat and drink normally unless instructed otherwise.     TREATMENT FOR COMMON SIDE EFFECTS:  - Mild abdominal pain, nausea, belching, bloating or excessive gas:  rest,   eat lightly and use a heating pad.  - Sore Throat: treat with throat lozenges and/or gargle with warm salt   water.  - Because air was used during the procedure, expelling large amounts of air   from your rectum or belching is normal.  - If a bowel prep was taken, you may not have a bowel movement for 1-3 days.    This is normal.  SYMPTOMS TO WATCH FOR AND REPORT TO YOUR PHYSICIAN:  1. Abdominal pain or bloating, other than gas cramps.  2. Chest pain.  3. Back pain.  4. Signs of infection such as: chills or fever occurring within 24 hours   after the procedure.  5. Rectal bleeding, which would show as bright red, maroon, or black stools.   (A tablespoon of blood from the rectum is not serious, especially  if   hemorrhoids are present.)  6. Vomiting.  7. Weakness or dizziness.  GO DIRECTLY TO THE NEAREST EMERGENCY ROOM IF YOU HAVE ANY OF THE FOLLOWING:      Difficulty breathing              Chills and/or fever over 101 F   Persistent vomiting and/or vomiting blood   Severe abdominal pain   Severe chest pain   Black, tarry stools   Bleeding- more than one tablespoon   Any other symptom or condition that you feel may need urgent attention  Your doctor recommends these additional instructions:  If any biopsies were taken, your doctors clinic will contact you in 1 to 2   weeks with any results.  - Discharge patient to home.   - Resume previous diet.   - Continue present medications.   - Repeat colonoscopy in 10 years for screening purposes.   - Written discharge instructions were provided to the patient.   - The signs and symptoms of potential delayed complications were discussed   with the patient.   - Patient has a contact number available for emergencies.   - Return to normal activities tomorrow.  For questions, problems or results please call your physician - Prabha Dow MD at Work:  [phone].  [facility], EMERGENCY ROOM PHONE NUMBER: [phone]  IF A COMPLICATION OR EMERGENCY SITUATION ARISES AND YOU ARE UNABLE TO REACH   YOUR PHYSICIAN - GO DIRECTLY TO THE EMERGENCY ROOM.  Prabha Dow MD  8/17/2023 11:42:09 AM  This report has been verified and signed electronically.  Dear patient,  As a result of recent federal legislation (The Federal Cures Act), you may   receive lab or pathology results from your procedure in your [facility]   account before your physician is able to contact you. Your physician or   their representative will relay the results to you with their   recommendations at their soonest availability.  Thank you,  PROVATION

## 2023-09-26 ENCOUNTER — OFFICE VISIT (OUTPATIENT)
Dept: UROGYNECOLOGY | Facility: CLINIC | Age: 43
End: 2023-09-26
Payer: COMMERCIAL

## 2023-09-26 VITALS
DIASTOLIC BLOOD PRESSURE: 68 MMHG | SYSTOLIC BLOOD PRESSURE: 108 MMHG | BODY MASS INDEX: 22.66 KG/M2 | WEIGHT: 132 LBS | HEART RATE: 67 BPM

## 2023-09-26 DIAGNOSIS — N39.3 STRESS INCONTINENCE: ICD-10-CM

## 2023-09-26 DIAGNOSIS — N81.11 CYSTOCELE, MIDLINE: Primary | ICD-10-CM

## 2023-09-26 PROCEDURE — 99999 PR PBB SHADOW E&M-EST. PATIENT-LVL III: CPT | Mod: PBBFAC,,, | Performed by: OBSTETRICS & GYNECOLOGY

## 2023-09-26 PROCEDURE — 99215 PR OFFICE/OUTPT VISIT, EST, LEVL V, 40-54 MIN: ICD-10-PCS | Mod: S$GLB,,, | Performed by: OBSTETRICS & GYNECOLOGY

## 2023-09-26 PROCEDURE — 99999 PR PBB SHADOW E&M-EST. PATIENT-LVL III: ICD-10-PCS | Mod: PBBFAC,,, | Performed by: OBSTETRICS & GYNECOLOGY

## 2023-09-26 PROCEDURE — 99215 OFFICE O/P EST HI 40 MIN: CPT | Mod: S$GLB,,, | Performed by: OBSTETRICS & GYNECOLOGY

## 2023-09-26 NOTE — Clinical Note
Hey! I saw this kathya woman who appears to mostly have anterior prolapse and stress urinary incontinence symptoms. She is seeing you on 10/11. You recently banded hemorrhoids but she is considering a hemorrhoidectomy. Told her that we can try to find time together if you move forward.  Maria Ines

## 2023-09-26 NOTE — PROGRESS NOTES
Chief Complaint   Patient presents with    Vaginal Prolapse        HPI: Patient is a 43 y.o. female  who presents today with concerns about vaginal prolapse. She states that she sees Dr. Dow for hemorrhoids and recently underwent banding. States that she has an upcoming appointment for evaluation due to pain and bleeding subsequent to the banding.     Patient reports that during intercourse she notices heaviness and pressure. She also states that when she wipes after urination she feels pressure at the vaginal opening. She denies dyspareunia. When she jumps she also feels pressure and with a full bladder finds that she leaks urine. With a full bladder she may also leak with a sneeze. In the last year she will sometimes notices a slow urine stream. Doesn't happen all the time. She reports a continuous stream. She denies splinting to urinate or have a BM. If she has constipation she feels a pressure and a sensation like something is about to fall out of the vagina.     She denies urinary urgency. She denies urinary frequency and urgency urinary incontinence. Denies accidental bowel leakage.       Review of Systems   Constitutional:  Negative for activity change, appetite change, chills, fatigue, fever and unexpected weight change.   HENT:  Negative for nasal congestion, dental problem, hearing loss, mouth dryness and sore throat.    Eyes:  Negative for pain and eye dryness.   Respiratory:  Negative for cough, shortness of breath and wheezing.    Cardiovascular:  Negative for chest pain, palpitations and leg swelling.   Gastrointestinal:  Positive for blood in stool and rectal pain. Negative for abdominal distention, abdominal pain and constipation.   Endocrine: Negative for cold intolerance and heat intolerance.   Genitourinary:  Negative for dyspareunia, hot flashes and vaginal dryness.   Musculoskeletal:  Negative for arthralgias, back pain, gait problem, joint swelling, myalgias, neck pain and neck  "stiffness.   Integumentary:  Negative for rash.   Neurological:  Negative for dizziness, tremors, seizures, speech difficulty, weakness, light-headedness, numbness and headaches.   Psychiatric/Behavioral:  Negative for confusion, dysphoric mood and sleep disturbance. The patient is not nervous/anxious.         Past Medical History:   Diagnosis Date    Anemia     "life long"    High cholesterol     Hypothyroidism     Unspecified hemorrhoids     Unspecified hemorrhoids        Past Surgical History:   Procedure Laterality Date    COLONOSCOPY N/A 08/14/2023    Procedure: COLONOSCOPY;  Surgeon: Prabha Dow MD;  Location: [facility] ([address] [place] [address]);  Service: Endoscopy;  Laterality: N/A;  inst via portal  possible banding  pre call confirmed    COLONOSCOPY, WITH HEMORRHOID BANDING      TUBAL LIGATION           Current Outpatient Medications:     ferrous sulfate (FEOSOL) 325 mg (65 mg iron) Tab tablet, Take 325 mg by mouth daily with breakfast., Disp: , Rfl:     Review of patient's allergies indicates:  No Known Allergies    Family History   Problem Relation Age of Onset    Hyperlipidemia Mother     Hyperlipidemia Father     Gout Father     Hyperlipidemia Brother     No Known Problems Brother     Lung cancer Maternal Uncle     Breast cancer Cousin     Colon cancer Neg Hx     Diabetes Neg Hx     Hypertension Neg Hx     Ovarian cancer Neg Hx     Stroke Neg Hx        Social History     Socioeconomic History    Marital status:     Number of children: 3   Tobacco Use    Smoking status: Never    Smokeless tobacco: Never   Substance and Sexual Activity    Alcohol use: Yes     Comment: socially    Drug use: Never    Sexual activity: Yes     Partners: Male     Birth control/protection: See Surgical Hx   Social History Narrative    Lives with spouse, child (8 yo daughter), and brother-in law's family (5), both mother in law and father in law. Has two children in college. Feels safe in her home.      Social Determinants of " Health     Financial Resource Strain: Low Risk  (2019)    Overall Financial Resource Strain (CARDIA)     Difficulty of Paying Living Expenses: Not very hard   Food Insecurity: No Food Insecurity (2019)    Hunger Vital Sign     Worried About Running Out of Food in the Last Year: Never true     Ran Out of Food in the Last Year: Never true   Transportation Needs: No Transportation Needs (2019)    PRAPARE - Transportation     Lack of Transportation (Medical): No     Lack of Transportation (Non-Medical): No   Physical Activity: Insufficiently Active (2019)    Exercise Vital Sign     Days of Exercise per Week: 1 day     Minutes of Exercise per Session: 90 min   Stress: No Stress Concern Present (2019)    [employer] - Occupational Stress Questionnaire     Feeling of Stress : Only a little   Social Connections: Unknown (2019)    Social Connection and Isolation Panel [NHANES]     Frequency of Communication with Friends and Family: More than three times a week     Frequency of Social Gatherings with Friends and Family: Once a week     Active Member of Clubs or Organizations: No     Attends Club or Organization Meetings: Never     Marital Status:        OB History          4    Para   3    Term   3            AB   1    Living   3         SAB        IAB   1    Ectopic        Multiple        Live Births   3           Obstetric Comments   14/R/4-5  No abnormal pap  No STDs   x 3, EAB x 1, BB 9#               Gyn History    Mammogram: 22 BIRADS 1 negative  Pap smear: 22 Negative, HPV negative  LMP: No LMP recorded.   Postmenopausal bleeding: n/a      INITIAL PHYSICAL EXAMINATION    Vitals:    23 1400   BP: 108/68   Pulse: 67      General: Healthy in appearance, Well nourished, Affect Normal, NAD.  Neck: Supple, No masses, Trachea midline, Thyroid normal size,  non-tender, no masses or nodules.  Nodes: No clavicular/cervical  adenopathy.  Skin: Normal temperature, No atypical lesions or rash.  Heart: Normal sounds, no murmurs  Lungs: Normal respiratory effort.  Gastrointestinal: Non tender, Non distended, No masses, guarding or  rebound, No hepatosplenomegally, No hernia.  Ext: No clubbing, cyanosis, edema or varicosities.  DTR's: 2+ bilaterally  Strength 5/5 bilateral upper and lower extremities    Genitourinary-  Vulva: normal without lesions, masses, atrophy or pain  Urethra: meatus central and normal in appearance, no prolapse/caruncle, no masses or discharge. Empty supine stress test was negative.  Bladder: non-tender, no masses  Vagina: No discharge or lesions, no atrophy, no masses appreciated.  [See POP-Q]  Cervix: no lesions, no discharge  Uterus:  non-tender, anteverted, approximately 6 weeks size  Adnexa: no masses, non tender.  Rectal: deferred Normal tone and anorectal angle  Neuro: S2-4- pin prick and light touch intact, Anal wink present  Levator strength: 1/5  Levator tenderness: left 0/10 and right 0/10    POP-Q Exam- pelvic organ prolapse quantitative    Aa -1  Anterior Wall Ba -1  Anterior wall C -5.5  Cervix or cuff   Gh 5  Genital hiatus pb 3  perineal body tvl 10  Total vaginal length   Ap -2  Posterior wall Bp -2  Posterior wall D -9  Posterior fornix     with Uterus    POP-Q Stage:2      No visits with results within 1 Day(s) from this visit.   Latest known visit with results is:   Admission on 08/14/2023, Discharged on 08/14/2023   Component Date Value Ref Range Status    POC Preg Test, Ur 08/14/2023 Negative  Negative Final     Acceptable 08/14/2023 Yes   Final        ASSESSMENT & PLAN:    Cystocele, midline    Stress incontinence  -     Simple Urodynamics; Future; Expected date: 10/03/2023       Exam findings and treatment options were discussed in detail with the patient. Her symptoms and exam findings are consistent with stress urinary incontinence and pelvic organ prolapse (anterior vaginal).  We discussed various surgical and nonsurgical management options including pessary use, pelvic floor rehabilitation, and reconstructive surgery. In terms of surgery, vaginal, abdominal and laparoscopic approaches were considered. She feels that her condition is severe enough to consider surgical correction. The planned surgical procedure includes Anterior repair, perineorrhaphy, Bulkamid versus midurethral sling. Patient was provided the opportunity to ask questions and further discussion occurred as necessary. She was instructed to make a follow-up appointment for further counseling and urodynamic testing as indicated.  Patient states that she is bothered by her hemorrhoids and plans to discuss hemorrhoidectomy with Dr. Dow at her upcoming appointment on 10/11/23. If they decide to proceed with plan to coordinate the surgery.     All questions were answered today. The patient was encouraged to contact the office as needed with any additional questions or concerns.     Total time spent on visit was 40 minutes.  This includes face to face time and non-face to face time preparing to see the patient (eg, review of tests), Obtaining and/or reviewing separately obtained history, Documenting clinical information in the electronic or other health record, Independently interpreting resultsand communicating results to the patient/family/caregiver, or Care coordination.    Maria Ines Castanon MD

## 2023-09-27 ENCOUNTER — PATIENT MESSAGE (OUTPATIENT)
Dept: SURGERY | Facility: CLINIC | Age: 43
End: 2023-09-27
Payer: COMMERCIAL

## 2023-09-27 ENCOUNTER — TELEPHONE (OUTPATIENT)
Dept: SURGERY | Facility: CLINIC | Age: 43
End: 2023-09-27
Payer: COMMERCIAL

## 2023-09-27 DIAGNOSIS — K62.89 ANAL PAIN: ICD-10-CM

## 2023-09-27 DIAGNOSIS — K64.8 INTERNAL HEMORRHOIDS: Primary | ICD-10-CM

## 2023-09-27 RX ORDER — HYDROCORTISONE 25 MG/G
CREAM TOPICAL 2 TIMES DAILY
Qty: 28 G | Refills: 2 | Status: ON HOLD | OUTPATIENT
Start: 2023-09-27 | End: 2023-12-26 | Stop reason: HOSPADM

## 2023-09-27 RX ORDER — LIDOCAINE 50 MG/G
OINTMENT TOPICAL
Qty: 30 G | Refills: 2 | Status: SHIPPED | OUTPATIENT
Start: 2023-09-27

## 2023-09-27 NOTE — TELEPHONE ENCOUNTER
Hey I can do 2:30 tomorrow. In the mean time I'll send in anusol and lidocaine. Please let her know to purchase lidocaine otc if not covered by her insurance. Although looks like both are covered.   Both ok to be administered rectally. Likely has a painful thrombosed hemorrhoid.     Thanks  Jessika

## 2023-09-28 ENCOUNTER — OFFICE VISIT (OUTPATIENT)
Dept: SURGERY | Facility: CLINIC | Age: 43
End: 2023-09-28
Payer: COMMERCIAL

## 2023-09-28 VITALS
HEIGHT: 64 IN | HEART RATE: 76 BPM | BODY MASS INDEX: 22.81 KG/M2 | WEIGHT: 133.63 LBS | DIASTOLIC BLOOD PRESSURE: 74 MMHG | SYSTOLIC BLOOD PRESSURE: 112 MMHG

## 2023-09-28 DIAGNOSIS — K62.89 ANAL PAIN: Primary | ICD-10-CM

## 2023-09-28 PROCEDURE — 99999 PR PBB SHADOW E&M-EST. PATIENT-LVL IV: CPT | Mod: PBBFAC,,, | Performed by: NURSE PRACTITIONER

## 2023-09-28 PROCEDURE — 99999 PR PBB SHADOW E&M-EST. PATIENT-LVL IV: ICD-10-PCS | Mod: PBBFAC,,, | Performed by: NURSE PRACTITIONER

## 2023-09-28 PROCEDURE — 99213 OFFICE O/P EST LOW 20 MIN: CPT | Mod: S$GLB,,, | Performed by: NURSE PRACTITIONER

## 2023-09-28 PROCEDURE — 99213 PR OFFICE/OUTPT VISIT, EST, LEVL III, 20-29 MIN: ICD-10-PCS | Mod: S$GLB,,, | Performed by: NURSE PRACTITIONER

## 2023-09-28 NOTE — PATIENT INSTRUCTIONS
Diltiazem cream from compounding pharmacy like [facility] on [address].  In [place], there's [place] pharmacy, [facility].  3x/day to anus.  Lidocaine as needed for pain. 5 mins prior to bm.  Warm soaks.  Keep appt with Dr. Dow on 10/11 incase no improvement

## 2023-09-28 NOTE — PROGRESS NOTES
"  CRS Office Visit History and Physical    Referring Md:   Prabha Dow Md  [address] [place] [address]    SUBJECTIVE:     Chief Complaint: anal pain    History of Present Illness:  The patient is known patient to this practice, new to me.   Course is as follows:  Patient is a 43 y.o. female presents with anal pain after RBL during colonoscopy 8/14.  Rx anusol rectal cream and lidocaine sent yesterday.  She reports pain after RBL, that never improved. Has progressively worsened.  Minimal pain with bm, then increases over the next 2 hrs.  Painful to sit. Intermittent dull ache.  Taking pain medication at night as this is when it is worse.       Last Colonoscopy completed on 8/14/2023  - normal  - repeat in 10 years      Review of patient's allergies indicates:  No Known Allergies    Past Medical History:   Diagnosis Date    Anemia     "life long"    High cholesterol     Hypothyroidism     Unspecified hemorrhoids     Unspecified hemorrhoids      Past Surgical History:   Procedure Laterality Date    COLONOSCOPY N/A 08/14/2023    Procedure: COLONOSCOPY;  Surgeon: Prabha Dow MD;  [facility] ([address] [place] [address]);  Service: Endoscopy;  Laterality: N/A;  inst via portal  possible banding  pre call confirmed    COLONOSCOPY, WITH HEMORRHOID BANDING      TUBAL LIGATION       Family History   Problem Relation Age of Onset    Hyperlipidemia Mother     Hyperlipidemia Father     Gout Father     Hyperlipidemia Brother     No Known Problems Brother     Lung cancer Maternal Uncle     Breast cancer Cousin     Colon cancer Neg Hx     Diabetes Neg Hx     Hypertension Neg Hx     Ovarian cancer Neg Hx     Stroke Neg Hx      Social History     Tobacco Use    Smoking status: Never    Smokeless tobacco: Never   Substance Use Topics    Alcohol use: Yes     Comment: socially    Drug use: Never        Review of Systems:  Review of Systems   Gastrointestinal:         Anal pain       OBJECTIVE:     Vital Signs (Most " "Recent)  Blood Pressure 112/74 (BP Location: Left arm, Patient Position: Sitting)   Pulse 76   Height 5' 4.02" (1.626 m)   Weight 60.6 kg (133 lb 9.6 oz)   Body Mass Index 22.92 kg/m²     Physical Exam:  General: White female in no distress   Neuro: Alert and oriented to person, place, and time.  Moves all extremities.     HEENT: No icterus.  Trachea midline  Respiratory: Respirations are even and unlabored, no cough or audible wheezing  Skin: Warm dry and intact, No visible rashes, no jaundice    Labs reviewed today:  Lab Results   Component Value Date    WBC 6.09 03/17/2023    HGB 10.8 (L) 03/17/2023    HCT 33.6 (L) 03/17/2023     03/17/2023    CHOL 241 (H) 03/17/2023    TRIG 141 03/17/2023    HDL 53 03/17/2023    ALT 12 03/17/2023    AST 15 03/17/2023     (L) 03/17/2023    K 4.0 03/17/2023     03/17/2023    CREATININE 0.8 03/17/2023    BUN 21 (H) 03/17/2023    CO2 23 03/17/2023    TSH 3.396 03/17/2023    HGBA1C 5.3 03/17/2023       Imaging reviewed today:  none    Endoscopy reviewed today:  Last Colonoscopy completed on 8/14/2023  - normal  - repeat in 10 years    Anorectal Exam:    Anal Skin:  RAL external hemorrhoidal tag, otherwise normal  - pain noted with attempted eversion of anus to posterior midline    Digital Rectal Exam:  deferred    ASSESSMENT/PLAN:     Diagnoses and all orders for this visit:    Anal pain  -     diltiazem HCl (DILTIAZEM 2% CREAM); Apply topically 3 (three) times daily. Apply topically to anal area.        The patient was instructed to:  Diltiazem cream tid   Lidocaine prn  Warms soaks  Increase water intake to at least 8-10 glasses of water per day.  Take a daily fiber supplement (Konsyl, Benefiber, Metamucil) and increase dietary intake to 20-30 grams/day.  Avoid straining or spending >5min/event with bowel movements.  Follow-up in clinic with MD at [facility] already scheduled.      RIO DeniseP-C  Colon and Rectal Surgery      " daughter

## 2023-10-10 ENCOUNTER — TELEPHONE (OUTPATIENT)
Dept: SURGERY | Facility: CLINIC | Age: 43
End: 2023-10-10
Payer: COMMERCIAL

## 2023-10-11 ENCOUNTER — OFFICE VISIT (OUTPATIENT)
Dept: SURGERY | Facility: CLINIC | Age: 43
End: 2023-10-11
Payer: COMMERCIAL

## 2023-10-11 VITALS
WEIGHT: 135.13 LBS | SYSTOLIC BLOOD PRESSURE: 113 MMHG | HEART RATE: 70 BPM | DIASTOLIC BLOOD PRESSURE: 72 MMHG | BODY MASS INDEX: 23.19 KG/M2

## 2023-10-11 DIAGNOSIS — K60.2 ANAL FISSURE: Primary | ICD-10-CM

## 2023-10-11 PROCEDURE — 99213 OFFICE O/P EST LOW 20 MIN: CPT | Mod: 25,S$GLB,, | Performed by: COLON & RECTAL SURGERY

## 2023-10-11 PROCEDURE — 46600 PR DIAG2STIC A2SCOPY: ICD-10-PCS | Mod: S$GLB,,, | Performed by: COLON & RECTAL SURGERY

## 2023-10-11 PROCEDURE — 46600 DIAGNOSTIC ANOSCOPY SPX: CPT | Mod: S$GLB,,, | Performed by: COLON & RECTAL SURGERY

## 2023-10-11 PROCEDURE — 99999 PR PBB SHADOW E&M-EST. PATIENT-LVL III: CPT | Mod: PBBFAC,,, | Performed by: COLON & RECTAL SURGERY

## 2023-10-11 PROCEDURE — 99999 PR PBB SHADOW E&M-EST. PATIENT-LVL III: ICD-10-PCS | Mod: PBBFAC,,, | Performed by: COLON & RECTAL SURGERY

## 2023-10-11 PROCEDURE — 99213 PR OFFICE/OUTPT VISIT, EST, LEVL III, 20-29 MIN: ICD-10-PCS | Mod: 25,S$GLB,, | Performed by: COLON & RECTAL SURGERY

## 2023-10-11 NOTE — PROGRESS NOTES
"CRS Office Visit History and Physical      SUBJECTIVE:     Chief Complaint: Hemorrhoids    History of Present Illness:  Patient is a 43 y.o. female presents for follow-up.  Had persistent pain after Bms following banding. This seems like the same pain she had prior to the banding.  Currently spending 3min max on the toilet. Doesn't think stool is hard or that she has to strain. Still taking the fiber supplement in the am.  Having some bleeding as well.      (9/28/2023)  Saw Jessika for pain following banding. Started Diltiazem and lidocaine ointment.    (8/14/2023)  Colonoscopy with banding    (7/12/2023)  Symptoms are pain and bleeding. Worse over the last few months. Worse after BM.  Symptoms have been present for 7 years.  Has tried topical hemorrhoid cream. Some help with these.  Associated bleeding: yes  Previous anorectal procedures: No  Spends 10-20 on toilet to have a BM.   Tried powder fiber before ([employer]), started flax seed 2 weeks ago.  Blood thinners: No  Last Colonoscopy: None  Family history of colorectal cancer or IBD: None.    Review of patient's allergies indicates:  No Known Allergies    Past Medical History:   Diagnosis Date    Anemia     "life long"    High cholesterol     Hypothyroidism     Unspecified hemorrhoids     Unspecified hemorrhoids      Past Surgical History:   Procedure Laterality Date    COLONOSCOPY N/A 08/14/2023    Procedure: COLONOSCOPY;  Surgeon: Prabha Dow MD;  Location: [address]);  Service: Endoscopy;  Laterality: N/A;  inst via portal  possible banding  pre call confirmed    COLONOSCOPY, WITH HEMORRHOID BANDING      TUBAL LIGATION       Family History   Problem Relation Age of Onset    Hyperlipidemia Mother     Hyperlipidemia Father     Gout Father     Hyperlipidemia Brother     No Known Problems Brother     Lung cancer Maternal Uncle     Breast cancer Cousin     Colon cancer Neg Hx     Diabetes Neg Hx     Hypertension Neg Hx     Ovarian cancer Neg Hx  "    Stroke Neg Hx      Social History     Tobacco Use    Smoking status: Never    Smokeless tobacco: Never   Substance Use Topics    Alcohol use: Yes     Comment: socially    Drug use: Never        Review of Systems:  ROS    OBJECTIVE:     Vital Signs (Most Recent)  /72   Pulse 70   Wt 61.3 kg (135 lb 2.3 oz)   BMI 23.19 kg/m²     Physical Exam:  General: White female in no distress   Neuro: Alert and oriented x 4.  Moves all extremities.     HEENT: No icterus.  Trachea midline  Respiratory: Respirations are even and unlabored  Cardiac: Regular rate  Extremities: Warm dry and intact  Skin: No rashes    Anorectal:   External exam: Perianal skin with skin tag in right anterior, skin healthy  Digital exam revealed normal tone. No masses, no blood    Anoscopy:  Tolerated fairly well.  In the anterior midline I do see some friable tissue and a fissure with some exposed muscle fibers deep within the anal canal    ASSESSMENT/PLAN:     42yo F w/ prior banding for hemorrhoids, now with persistent pain and some bleeding.  There is a friable fissure in the anterior midline.  Unclear whether this is a banding site or unrelated.    We reviewed surgical treatment options include Botox injection or lateral internal sphincterotomy (LIS).  Botox has a reported success rate of 40-70%, and can be associated with temporary changes in continence to gas/liquid stool.  LIS has a success rate of %, but is associated with changes in continence in 8-30% of patients at 6 years post-op.  These changes may be minor (I.e. Gas or liquid stool only).    She would like to coordinate with her planned cystocele repair with Dr Castanon.  She is leaning towards sphincterotomy.  Will do final consent on the day of surgery. In the interim would continue Diltiazem and fiber. Discussed that I would leave the skin tag alone, as excising may elongate the fissure given location and is unlikely to improve pain.    Prabha Dow MD  Staff  Surgeon, Colon and Rectal Surgery  [facility]

## 2023-10-11 NOTE — Clinical Note
Hey!  She has a little fissure, not sure if it is related to her banding or not.  I told her I could do Botox or a sphincterotomy at the time of your cystocele repair.  Just lmk when you are thinking in terms of scheduling.  My part would add like 5 min. Rubi

## 2023-10-20 ENCOUNTER — TELEPHONE (OUTPATIENT)
Dept: SURGERY | Facility: CLINIC | Age: 43
End: 2023-10-20
Payer: COMMERCIAL

## 2023-10-23 ENCOUNTER — TELEPHONE (OUTPATIENT)
Dept: SURGERY | Facility: CLINIC | Age: 43
End: 2023-10-23

## 2023-10-24 ENCOUNTER — PROCEDURE VISIT (OUTPATIENT)
Dept: UROGYNECOLOGY | Facility: CLINIC | Age: 43
End: 2023-10-24
Payer: COMMERCIAL

## 2023-10-24 VITALS
HEIGHT: 64 IN | SYSTOLIC BLOOD PRESSURE: 100 MMHG | DIASTOLIC BLOOD PRESSURE: 68 MMHG | BODY MASS INDEX: 23.04 KG/M2 | WEIGHT: 134.94 LBS

## 2023-10-24 DIAGNOSIS — N81.11 CYSTOCELE, MIDLINE: ICD-10-CM

## 2023-10-24 DIAGNOSIS — N39.3 STRESS INCONTINENCE: Primary | ICD-10-CM

## 2023-10-24 LAB
BILIRUB SERPL-MCNC: NORMAL MG/DL
BLOOD URINE, POC: NORMAL
CLARITY, POC UA: CLEAR
COLOR, POC UA: YELLOW
GLUCOSE UR QL STRIP: NORMAL
KETONES UR QL STRIP: NORMAL
LEUKOCYTE ESTERASE URINE, POC: NORMAL
NITRITE, POC UA: NORMAL
PH, POC UA: 5
PROTEIN, POC: NORMAL
SPECIFIC GRAVITY, POC UA: 1.01
UROBILINOGEN, POC UA: NORMAL

## 2023-10-24 PROCEDURE — 51741 PR UROFLOWMETRY, COMPLEX: ICD-10-PCS | Mod: 51,S$GLB,, | Performed by: OBSTETRICS & GYNECOLOGY

## 2023-10-24 PROCEDURE — 51797 PR VOIDING PRESS STUDY INTRA-ABDOMINAL VOID: ICD-10-PCS | Mod: S$GLB,,, | Performed by: OBSTETRICS & GYNECOLOGY

## 2023-10-24 PROCEDURE — 51741 ELECTRO-UROFLOWMETRY FIRST: CPT | Mod: 51,S$GLB,, | Performed by: OBSTETRICS & GYNECOLOGY

## 2023-10-24 PROCEDURE — 51784 PR ANAL/URINARY MUSCLE STUDY: ICD-10-PCS | Mod: 51,S$GLB,, | Performed by: OBSTETRICS & GYNECOLOGY

## 2023-10-24 PROCEDURE — 51797 INTRAABDOMINAL PRESSURE TEST: CPT | Mod: S$GLB,,, | Performed by: OBSTETRICS & GYNECOLOGY

## 2023-10-24 PROCEDURE — 81002 URINALYSIS NONAUTO W/O SCOPE: CPT | Mod: S$GLB,,, | Performed by: OBSTETRICS & GYNECOLOGY

## 2023-10-24 PROCEDURE — 81002 PR URINALYSIS NONAUTO W/O SCOPE: ICD-10-PCS | Mod: S$GLB,,, | Performed by: OBSTETRICS & GYNECOLOGY

## 2023-10-24 PROCEDURE — 51728 PR COMPLEX CYSTOMETROGRAM VOIDING PRESSURE STUDIES: ICD-10-PCS | Mod: S$GLB,,, | Performed by: OBSTETRICS & GYNECOLOGY

## 2023-10-24 PROCEDURE — 51784 ANAL/URINARY MUSCLE STUDY: CPT | Mod: 51,S$GLB,, | Performed by: OBSTETRICS & GYNECOLOGY

## 2023-10-24 PROCEDURE — 51728 CYSTOMETROGRAM W/VP: CPT | Mod: S$GLB,,, | Performed by: OBSTETRICS & GYNECOLOGY

## 2023-10-26 NOTE — PROCEDURES
Complex Urodynamics    Date/Time: 10/24/2023 3:00 PM    Performed by: Maria Ines Castanon MD  Authorized by: Maria Ines Castanon MD  Preparation: Patient was prepped and draped in the usual sterile fashion.  Local anesthesia used: no    Anesthesia:  Local anesthesia used: no    Sedation:  Patient sedated: no    Patient tolerance: patient tolerated the procedure well with no immediate complications      TITLE OF OPERATION:  Complex cystometry.  Complex uroflowmetry.  Electromyography with surface electrodes.  Pressure voiding flow study.  Abdominal pressure measurement.  Leak point pressure measurement.    INDICATIONS:  Stress urinary incontinence  Cystocele - stage 2    SURGEONS NARRATIVE:  A time out was performed in which the patient identity and procedure were confirmed.  Urodynamic evaluation was performed using a computerized system ([employer]  Uroflowmetry was performed on the patient in the sitting position without catheters in place.  Subsequent urodynamic testing was performed with the patient in the lithotomy position at 45 degrees. Water charged catheters were used with sterile water as the infusion medium. Vesical and abdominal (rectal) pressures were measured, and detrusor pressure was calculated. EMG activity was recorded with surface electrodes. During filling, room temperature sterile water was infused at a rate of 30 cubic centimeters per minute. The patient was asked cough after instillation of each 100cc volume. Two Valsalva leak point pressures and two cough leak point pressures were performed with the catheters in place at 300 cubic centimeters and again at maximum capacity. Valsalva leak point pressure was defined as the difference between vesical pressure at which leakage was noted (visualized at the external urethral meatus) and the baseline vesical pressure. Following urodynamic testing, a pressure flow study was performed with the patient in the sitting position. Vesical and  abdominal pressures were monitored and detrusor pressures were calculated. After the pressure flow study, the catheters were then removed. The patient tolerated the procedure well.       1.  VOIDING PHASE:      a.  Uroflowmetry:  Prolapse reduction: No  Voided volume:  1084 mL   Voiding time:   1:48 min:seconds  Max flow:  34.2 mL/s  Avg flow:   10 mL/s   PVR:   75 mL    The overall configuration of this uroflow study was  is interrupted.      b.  Pressure flow:  Prolapse reduction: No  Voided volume:   860 mL  Voiding time:   2:02 min:seconds  Peak flow:  20.5 mL/s   Avg flow:  7.8 mL/s  Max det pressure:  47  cm H20  Det pressure at max flow: n/a cm H20  Void initiated by  detrusor contraction.    Urethral relaxation (EMG):  no.        The overall configuration of this pressure flow study was is interrupted.      2.  FILLING PHASE:  1st sensation: 150 mL  1st desire:  300 mL  Strong desire:  487 mL  Urgency/ Capacity:  700 mL  EMG activity during filling:   quiet  Detrusor contractions observed: No.      3.  URETHRAL FUNCTION/STORAGE PHASE:    a.  WITH prolapse reduction:  CLPP (300 mL): Negative  at  171 cm H20  VLPP (300 mL): Negative  at  181 cm H20   CLPP (457 mL ):    Positive  at  181 cm H20  VLPP (457 mL):     Positive  at  132 cm H20    These findings are consistent with Positive urodynamic stress incontinence.    Assessment:  UF  is interrupted.  PF  is interrupted. Max capacity is normal at 700 mL.  DO (--).  LOUIS (+).      Plan:  Stress incontinence  -     Simple Urodynamics  -     POCT URINE DIPSTICK WITHOUT MICROSCOPE    Cystocele, midline      42 yo with stress urinary incontinence and stage 2 anterior vaginal prolapse presented for urodynamic testing. Will review findings nad discuss surgical intervention of Bulkamid versus midurethral sling and possible anterior repair.     Maria Ines Castanon MD

## 2023-11-09 ENCOUNTER — OFFICE VISIT (OUTPATIENT)
Dept: UROGYNECOLOGY | Facility: CLINIC | Age: 43
End: 2023-11-09
Payer: COMMERCIAL

## 2023-11-09 VITALS
HEART RATE: 70 BPM | SYSTOLIC BLOOD PRESSURE: 108 MMHG | BODY MASS INDEX: 22.66 KG/M2 | WEIGHT: 132 LBS | DIASTOLIC BLOOD PRESSURE: 77 MMHG

## 2023-11-09 DIAGNOSIS — K60.2 ANAL FISSURE: ICD-10-CM

## 2023-11-09 DIAGNOSIS — N81.11 CYSTOCELE, MIDLINE: Primary | ICD-10-CM

## 2023-11-09 DIAGNOSIS — N39.3 STRESS INCONTINENCE: ICD-10-CM

## 2023-11-09 PROCEDURE — 99214 OFFICE O/P EST MOD 30 MIN: CPT | Mod: S$GLB,,, | Performed by: OBSTETRICS & GYNECOLOGY

## 2023-11-09 PROCEDURE — 99214 PR OFFICE/OUTPT VISIT, EST, LEVL IV, 30-39 MIN: ICD-10-PCS | Mod: S$GLB,,, | Performed by: OBSTETRICS & GYNECOLOGY

## 2023-11-09 PROCEDURE — 99999 PR PBB SHADOW E&M-EST. PATIENT-LVL III: CPT | Mod: PBBFAC,,, | Performed by: OBSTETRICS & GYNECOLOGY

## 2023-11-09 PROCEDURE — 99999 PR PBB SHADOW E&M-EST. PATIENT-LVL III: ICD-10-PCS | Mod: PBBFAC,,, | Performed by: OBSTETRICS & GYNECOLOGY

## 2023-11-09 NOTE — PROGRESS NOTES
Chief Complaint   Patient presents with    Follow-up        HPI: Patient is a 43 y.o. female  presents today for a follow up visit after urodynamic testing. Patient is primarily bothered by her rectal pain. She sees Dr. Dow and they have discussed either performing botox injections or a lateral internal anal sphincterotomy for her anal fissure.     Patient states that if she has surgery with Dr. Dow she would like to proceed with an anterior repair and an anti-incontinence surgery. Would like to try to have her surgery this year due to meeting her insurance deductible.     REVIEW OF SYSTEMS:  A full 14-point ROS was performed and was significant for those  mentioned in the HPI.    The following portions of the patient's history were reviewed and updated as appropriate: allergies, current medications, past medical history, past surgical history and problem list.    PHYSICAL EXAMINATION    Vitals:    23 1411   BP: 108/77   Pulse: 70        General: Healthy in appearance, Well nourished, Affect Normal, NAD.    No visits with results within 1 Day(s) from this visit.   Latest known visit with results is:   Procedure visit on 10/24/2023   Component Date Value Ref Range Status    Color, UA 10/24/2023 Yellow   Final    pH, UA 10/24/2023 5   Final    WBC, UA 10/24/2023 neg   Final    Nitrite, UA 10/24/2023 neg   Final    Protein, POC 10/24/2023 neg   Final    Glucose, UA 10/24/2023 normal   Final    Ketones, UA 10/24/2023 neg   Final    Urobilinogen, UA 10/24/2023 normal   Final    Bilirubin, POC 10/24/2023 neg   Final    Blood, UA 10/24/2023 neg   Final    Clarity, UA 10/24/2023 Clear   Final    Spec Grav UA 10/24/2023 1.010   Final        ASSESSMENT & PLAN:  Cystocele, midline    Stress incontinence    Anal fissure    42 yo with stress urinary incontinence and stage 2 anterior vaginal prolapse presented for discussion of her urodynamic testing. Reviewed that her testing showed that she had an above  normal bladder capacity. Her sensations are normal and she leaked urine at 450 mL for the first time.   She would like to proceed with an anterior repair, perineorrhaphy, Bulkamid injections and cystoscopy. Discussed that it is possible that her insurance may not cover the Bulkamid and we may have to do a sling since she has not done any pelvic floor PT. She would like to have surgery done this year as she has met her deductible and we reviewed that I will have to coordinate with Dr. Dow and see what time we can find.   We reviewed the risks and benefits of the planned surgical procedure which included but were not limited to the potential conversion to an open or vaginal procedure, pelvic pain, pain with intercourse, infection, bleeding, need for transfusion, risks of transfusion, injury to bowel, urinary tract, blood vessels or nerves, urinary retention, prolonged catheterization, mesh erosion or infection, no change in symptoms, new onset LOUIS or UUI, recurrence of prolapse, change in urine stream, recurrent UTI's, fistula formation, need for ostomy (colostomy/ileostomy/ nephrostomy/ ileostomy) and need for further surgery. Patient was provided the opportunity to ask questions, All questions were answered. The informed consent was signed and a copy was provided to the patient.     All questions were answered today. The patient was encouraged to contact the office as needed with any additional questions or concerns.     Total time spent on visit was 30 minutes.  This includes face to face time and non-face to face time preparing to see the patient (eg, review of tests), Obtaining and/or reviewing separately obtained history, Documenting clinical information in the electronic or other health record, Independently interpreting resultsand communicating results to the patient/family/caregiver, or Care coordination.    Maria Ines Castanon MD

## 2023-11-09 NOTE — Clinical Note
Hey! I saw this patient today. She would like to try to coordinate something for this year if possible. She hadn't made up her mind yet about botox versus LIAS. I have asked Galina to look for some time but want to touch base with you to see what you have available. Thanks.

## 2023-11-14 ENCOUNTER — TELEPHONE (OUTPATIENT)
Dept: UROGYNECOLOGY | Facility: CLINIC | Age: 43
End: 2023-11-14
Payer: COMMERCIAL

## 2023-11-17 DIAGNOSIS — K60.2 ANAL FISSURE: ICD-10-CM

## 2023-11-17 DIAGNOSIS — Z01.818 PREOP TESTING: ICD-10-CM

## 2023-11-17 DIAGNOSIS — N39.3 STRESS INCONTINENCE: ICD-10-CM

## 2023-11-17 DIAGNOSIS — N81.11 CYSTOCELE, MIDLINE: Primary | ICD-10-CM

## 2023-12-11 ENCOUNTER — CLINICAL SUPPORT (OUTPATIENT)
Dept: UROGYNECOLOGY | Facility: CLINIC | Age: 43
End: 2023-12-11
Payer: COMMERCIAL

## 2023-12-11 DIAGNOSIS — Z01.818 PREOP TESTING: ICD-10-CM

## 2023-12-11 DIAGNOSIS — N81.11 CYSTOCELE, MIDLINE: Primary | ICD-10-CM

## 2023-12-11 PROCEDURE — 99999 PR PBB SHADOW E&M-EST. PATIENT-LVL I: ICD-10-PCS | Mod: PBBFAC,,,

## 2023-12-11 PROCEDURE — 99999 PR PBB SHADOW E&M-EST. PATIENT-LVL I: CPT | Mod: PBBFAC,,,

## 2023-12-11 NOTE — PROGRESS NOTES
Patient presents for pre-operative visit for anterior repair, perineorrhaphy, Bulkamid injections and cystoscopy with Dr. Castanon on 12/26.  Discussed pre-operative instructions and post-operative care and expectations. Patient provided with educational materials about the upcoming procedure.     All questions and concerns were addressed, and the patient has provided informed consent for the surgery.     The patient is scheduled for a follow-up visit on 1/9 to monitor progress and discuss any post-operative issues or concerns.     Clearance:  Not needed     Pre-Op w/Anesthesia: N/A     Consents: Scanned

## 2023-12-11 NOTE — PATIENT INSTRUCTIONS
Preparing for Surgery  Surgery Date: 12/26            Please Arrive at: 5:15 am  Location:                                [facility]  Surgery Center- Second Floor (Take escalators by Tyshawn)  [address]   Before Surgery  Stop taking aspirin/aspirin products (ex- Excedrin Migraine) 7 DAYS prior to surgery.  Stop taking all blood thinners (Coumadin, Plavix, Xarelto, Pletal) _____ days prior to surgery, or as directed by your doctor.  Stop all NSAIDS (ibuprofen, naproxen, diclofenac, meloxicam, celecoxib, etc) 7 days before surgery. Tylenol (acetaminophen) is okay to take.  Stop all herbal medications 14 days before surgery.  Do not drink alcoholic beverages 24 hours before and after the surgery.  Stop smoking as soon as possible.  If you take Ozembic, Semaglutide, Wegovy, Trulicity or Rybelsus, stop injections 2 weeks before surgery.  Surgery Information  Please bring a list of your medications to the surgery.  You will need someone to drive you home after the surgery. You will not be allowed to leave the facility alone or drive yourself after anesthesia and sedation.  Someone will call you the day before surgery with your arrival time. If you do not hear from us by noon, please call the office.  Preparing for Surgery  Take a complete shower or bath (shower is recommended) the evening before surgery.  using Hibiclens wash. Wash from your neck down with Hibiclens (chlorhexidine gluconate) soap that can be purchased over the counter. Keep the soap away from your face. Rinse thoroughly. If allergic or unable to obtain Hibiclens, you may also use Dial antibacterial soap. Shampoo and condition with your with your regular products.    Bowel and Dietary Prep  Dietary Prep  12:00 Noon the day BEFORE surgery: Begin liquid-only diet. This can include broth, smoothies, jello, etc.  Do not eat or drink anything (besides water or Gatorade) after 9:00 pm / MIDNIGHT before surgery. No gum, hard candy, mints or  chewing tobacco.  You should drink water and/or Gatorade until 2 hours before the surgery.  Bowel Prep  Three days prior to surgery:  take 1 cap full of MiraLAX twice a day  9:00 am the day BEFORE the procedure: 1 Dulcolax  After Pelvic Reconstructive Surgery  Incision(s)  You may have 3-5 incisions on your abdomen. The incisions will be closed with absorbable suture. Steri-strips are placed on the incisions, then Band-Aids. You can remove the Band-Aid as early as the day after surgery. You will remove the steri-strips 5-7 days after surgery.  Vaginal incisions are closed with absorbable/dissolvable sutures. It is normal to see spotting and vaginal discharge. The vaginal sutures will begin to break down/dissolve around 2-3 weeks after surgery. During that time, you may have slightly increased spotting and vaginal discharge. Do not pick at/ try to remove these sutures.  Call if you experience yellow/green, foul-smelling, or pus-like discharge from abdominal or vaginal incisions.  Pain control  Take prescribed medications as directed. It is important in the first few days after surgery to take your medications regularly. Pain should slowly improve every day. If it is not improving, please call our office.  DO NOT DRIVE or perform activities that require you to be alert while taking opioid pain medication, as these will make you drowsy.  You may restart all your normal medications taken prior to surgery when you get home, unless instructed otherwise on discharge from the hospital. Please contact your PCP regarding these medications.  Activity  Following surgery, you will be sore in the abdomen and/or pelvis. Return to activity SLOWLY and in moderation.  During the first 6 WEEKS after surgery, allow friends/family to take care of performing major household tasks, including cooking large meals, cleaning, grocery shopping, etc. You will NOT be bed-bound, and you CAN walk, but YOU MUST TAKE IT EASY!!! If you notice  increased spotting or pelvic pressure, you are probably doing too much and need to reduce activity.  You may resume driving when:  You are no longer taking narcotic pain medication  You are able to push the brakes on your car and check your blind spot without feeling abdominal discomfort. This may be up to 2-4 weeks after surgery.  To prevent infection NO BATHTUBS, SWIMMING POOLS, OR HOT TUBS for 6-8 weeks. You will take showers until cleared by our office. If you have abdominal incisions, direct the shower stream onto your back until your abdominal incisions are completely healed. Do not direct the shower stream directly onto your vagina either. Instead, use a cup of soapy water/plain water to gently pour over abdomen and/or vagina and gently pat dry.  NOTHING inside of the vagina for 6-8 weeks or until cleared by surgeon's office. This includes creams, tampons, and intercourse.  Do not sit or stand for more than 2 hours at a time. Take breaks regularly to walk around to promote blood flow and prevent blood clots. When you are sitting, sit on a soft pillow or donut pillow to reduce direct pressure on vaginal incisions. Try to recline and kick your feet up when possible.  DO NOT lift more than 10 pounds (a gallon of milk) for 6-8 weeks or until cleared by surgeon's office.  Try to reduce bending down in the weeks after surgery. When lifting anything less than 10 pounds, use proper technique to reduce straining:  Keep items close to your body during lifting  Keep feet shoulder width apart, bend your knees, keep back straight, and lift with your legs.  If you feel like you are straining, always ask for help.  Diet/Bowel Function  Upon returning home, you may eat or drink anything you like  It is IMPERATIVE to reduce straining with bowel movements after pelvic surgery. Due to the anesthesia and pain medication, you will be more constipated than normal. In addition to your daily bowel regimen, be sure to take the  prescribed stool softener as prescribed (can take up to 3 Colace per day), drink A LOT of water, and increase dietary fiber.  If you do not have a bowel movement by 4 days after surgery, take MILK OF MAGNESIA as directed, up to 4 times in a row. If you still do not have a bowel movement, call us for further instructions.  Urination  You will wake up from surgery with an indwelling urinary catheter (Griggs) in place. If you are unable to void completely before discharge, you will be sent home with an indwelling catheter (Griggs). The nurses will teach you about how to maintain it before you go home.  If sent home with a catheter, you will also be sent home with antibiotics and anti-bladder spasms medication, then will return to clinic within 1 week for another voiding trial. If appointment is not scheduled before you leave the hospital, please call us to set up in-office voiding trial.  Following surgery, you may notice that your voiding is slightly different - increased voiding frequency, post-void urgency, slower stream, and even increased urinary leakage -- which is normal as the nerves and tissues around your bladder heal. This should improve 2-3 weeks after surgery. If you develop symptoms of a UTI/bladder infection, or your voiding symptoms do not improve, please call us.  Follow up  We will plan to see you 2 weeks, 6 weeks, 6 months, and 1 year after surgery.  Remember, if you are sent home with an indwelling catheter, call us to schedule your post-op voiding trial within 1 week of surgery.      PLEASE WATCH OUT FOR THE FOLLOWING SYMPTOMS:  Fever > 101 degrees F  Painful urination  Increase in vaginal bleeding (soaking through 2+ pads)  Persistent nausea/vomiting  Pain that does not improve with prescribed pain medications    If you experience any of these symptoms or have concerns, please call your surgeon's office  Weekdays 8 am - 5 pm: [phone]  After 5 pm or on the weekend: [phone] to get in touch  with on-call physician. Ask to be connected to Labor and Delivery and the resident on-call will contact the surgeon if appropriate.    Welcome to [facility] and thank you for choosing us for your surgical healthcare needs.    Before, during, and after your surgery, you will be cared for by skilled and experienced medical professionals.    Our surgeons, anesthesiologists, nurses, specialists, social workers and  other healthcare professionals work with you and your family to ensure a safe, smooth and comfortable experience.    This guide was developed to provide you with important information you and your family should know about your upcoming surgery. Please read this guide carefully as the instructions will help you move smoothly through each phase of your surgery and recovery.    If you have any questions or concerns not covered in this guide, call the  Pre-Admit Department at [phone] or talk to your surgeon at [phone] or any member of your [facility] healthcare team.     Family and Visitor Waiting Rooms  There are several waiting areas available for family and friends:  1st Floor [facility] [phone]  2nd Floor [facility] [phone]  4th Floor [facility] [phone]     Each patient is allowed two family members or visitors in the waiting rooms while you are in surgery. For the convenience of visitors, we have open visiting hours so family members can visit loved ones whenever they want, for as long as they want. However, please respect the need for a quiet environment.    For the protection of all patients, people who are sick should not visit. All children must always be accompanied by an adult. Adults and children with fever, cough, rash, sore throat, nausea, diarrhea or recent exposure to illness should not visit. At any time, visitation may be limited to ensure continuity and quality of care.    Ask the nurse for more detailed information and visiting guidelines.      Financial  and Insurance Information  At [facility], we realize the financial aspects of hospital services are often complicated and difficult to understand. But you should not worry, because [facility] personnel will contact your insurance company for detailed information about your surgical coverage.  We want to make sure we meet your insurance company's billing requirements and, when needed, get their authorization for our services. Your insurance company will tell us what your out-of-pocket payment will be according to your policy. Your payment may include copayments, deductibles, co-insurance, non-covered services or other insurance limitations.  Please contact your insurance provider if you have additional questions about your specific coverage.  You may want to contact your insurance company to:    Better understand your insurance policy(s) for the expected hospital services   Make sure the [facility] facilities and physicians caring for you are approved by your insurance company   Identify any out-of-pocket payments you will have to make   Know and be able to pay your out-of-pocket payment before services are provided [facility] Admit/Authorization staff will contact your insurance company to:   Obtain insurance coverage and benefit information   Ask for authorization requirements and take the steps necessary to obtain authorization   Take steps necessary to get authorization for healthcare services   Determine your out-of-pocket payment    Also, [facility] financial counselors are available to assist you, whether you have insurance or not.      Day of Your Surgery   Take any medications specified by the Anesthesiologist with water the morning of your surgery.   You may brush your teeth and rinse your mouth.   Do not use any perfume, powder, body lotions or deodorant on the day of surgery.   Do not wear makeup. No mascara and no false eyelashes.   Nail polish should be removed. We understand that nails are expensive; however, we ask  these products be removed from at least one finger on each hand, preferably the index or middle finger. Your fingertips are used to accurately monitor your oxygen level during surgery by a device called an oximeter.   Wear comfortable clothes, such as a button front shirt and loose-fitting pants which are easily folded. For your safety, low-heeled shoes are recommended.   Leave all jewelry, including body piercings and valuables at home. [facility] is not responsible for valuables that are not secured in our service center.   Wear your hair loose, avoiding buns or ponytails or hairpieces at the back of the head and avoiding the use of clips, or pins and bands that bind hair. Do not use hairspray. A head covering will be provided for you during surgery.   You may wear glasses, dentures, and hearing aids before and after surgery. They may need to be removed before going into the operating room. Contact lenses worn before surgery must be removed before entering the operating room. Bring a case for your hearing aids, glasses, and/or contacts.   If you have an implantable device, such as a pacemaker or AICD, bring the device information card (if you have it) with you to your surgery.  Other items to bring with you:   Identification card such as 's license, passport, or other picture ID.   Insurance card   Copy of your advance directives   List of medications and allergies, if not already provided   Name and phone number of person to contact if your condition changes significantly    Be sure to arrange for a responsible person to drive you home.  If your physical condition changes such as a cold or respiratory illness, or if you must delay or cancel your surgery for some other reason, you must notify your surgeon right away. If you need to cancel your surgery and it is after hours or the day of your surgery, call [phone] as soon as possible.     After Your Surgery  Once your surgical procedure is complete, your  physician will make a report to your family.  Some physicians choose to meet with the family in a conference room; this is by physician preference and does not indicate there is a problem.  The anesthesia provider will remain with you until you are transferred back to the 4th floor or to the Post Anesthesia Care Unit (PACU/Recovery). This may take anywhere from 15-90 minutes after the surgeon reports to you.  You will have at least one overnight stay. To ensure your safety, you will not be allowed to leave the hospital alone or to drive yourself home following sedation or anesthesia. Please make sure you have someone available to bring you home when you are ready for discharge.  All patients are discharged from the hospital in a wheelchair.  For patients expecting to stay in the hospital, you should decide in advance if you will need someone with you for a few days and take the steps needed to arrange for that assistance.  [facility] understands that visiting family and friends provide valuable support. Please check with your nurse for visitation guidelines. Each nursing area has its own policies regarding numbers and types of visitors, including children. At any time, visitors may be limited to ensure continuity and quality of care.  There are several waiting areas available for family and friends on the 1st, 2nd, and 4th floors. Two family members or visitors may wait in the family waiting rooms while you are in surgery. We have open visiting hours for family and visitors. Please ask your nurse for more detailed information.     Vending machines are available on the 2nd floor in the [facility] in the ICU waiting room.   [facility] [employer] is on the 1st floor [facility] and is open 7:00 AM to 3:00 PM, Monday-Friday and 11:00 AM to 2:00 PM on Saturday and Sunday.   [employer] is located on the 2nd floor [facility] and is open 7:00 AM to 4:30PM Monday-Friday.          Alcohol Effects on Surgery    If  you are scheduled for surgery, it is important to be honest with your healthcare providers about your alcohol use. Your recovery from anesthesia and surgery may not proceed as planned if your healthcare providers are not aware of your history of alcohol use. Be sure to tell your healthcare provider how many drinks you have per day or per week.  Excessive alcohol use is defined as drinking more than three drinks per day and it can affect the outcome of your surgery. Binge drinking (consuming large amounts of alcohol infrequently, such as on weekends) can also affect the outcome of your surgery.  If you drink more than three drinks a day, you could have a complication, called alcohol withdrawal, after surgery. Alcohol withdrawal is a set of symptoms that people have when they suddenly stop drinking, after using alcohol for a long period of time. During withdrawal, a person's central nervous system overreacts and causes symptoms such as mild shakiness, sweating, hallucinating and other more serious side effects.  Untreated alcohol withdrawal can cause potentially life-threatening complications after surgery, including tremors, seizures, hallucinations, delirium tremors, and even death. Untreated alcohol withdrawal often leads to a longer stay in the intensive care unit and a longer hospital stay. Chronic heavy drinking also can interfere with several organ systems and biochemical controls in the body, causing serious, even life-threatening complications.    Healthcare providers can offer alcohol withdrawal treatment to provide these outcomes:   Decreased incidence of post-operative seizures and delirium tremors   Decreased use of restraining devices   Decreased incidence of patient falls   Reduced use of potent sedative medications   Decreased length of stay in the hospital   Less time on the mechanical ventilator   Lower incidence of organ failure and biochemical complications      Herbal Product Effects on  Surgery    Herbal products are available as tablets, liquids, granules, or powders, and are commonly contained in herbal teas. They are not regulated by the FDA. There are few instructions on proper use, dosage requirements, possible side effects, toxicity, and possible drug interactions. This makes it difficult to predict a patient's reaction to the herbal product.  Tell your doctor about any vitamins, supplements, or herbal products that you are taking. If you take these products prior to surgery, there is significant risk of real problems during and after your surgery. You may need to stop taking these products 7-10 days before your surgery. Ask your doctor for instructions.  Herbal products that may alter bleeding  Borage Seed Oil   Bogbean   Capsicum   Feverfew   Garlic   Mel   Ginko   Guarana (Zoom)   Horse Ambrose   Luzma D'Arco (Taheebo, Trumpet       Chowdhury,Lapacho)   Sweet Treece      Other agents that may alter bleeding times   Chinese Patent Medications   Chuifong Toukuw   Fish Oil (Omega-3 fatty adds)   Gamma Linolenic Acid (GLA)   Miracle Herb   Tung [facility]   Vitamin E              Herbal products that interact with  coumadin or antiplatelet agents     Martine Root   Anise   Arnica Flower   Asafoetida   Celery   Chamomile   Cinchona Bark   Danshen   Devil's Claw   Don Quai   Fenugreek   Garlic   Ginko   Ginseng   Green Tea   Horse Ambrose   Licorice Root   Lovage Bark   Papaya   Parsley   Passionflower Herb   Quassia   Red Treece   Rue   Eliu's Wort   Tamarind  Do's & Don'ts  Eating and Drinking  Do not eat or drink anything (including gum, mints, candy, eater, or coffee) after midnight the night before, even if your surgery will be performed under local anesthesia. When you brush your teeth, do not swallow any water.  Smoking  We strongly encourage you to stop smoking. This will reduce your risk of respiratory and anesthesia complications after your operation. You may want to speak with your  primary care physician about smoking cessation programs.  Medications  If you have not already done so, please bring a list of all prescribed and over -the-counter medications you take, including dosage and strength, on the day of your surgery. Check with your surgeon or anesthesiologist on the day of surgery.   Patients with Diabetes  On the morning of surgery do not take your insulin or diabetic medication but bring it with you to the hospital. If your arrival time is after 9am, or you are not feeling well, immediately notify the check in staff that you are an insulin dependent diabetic. If you take insulin and need to do a bowel prep the day before surgery, check with your medical doctor about insulin dosage.  Patients with Asthma  You should bring your inhalers with you to the hospital. If needed, you may use your inhaler as directed by your physician.  Nail Polish, nail tips, wraps, gels, etc.  All nail polish should be removed before your arrival for surgery, we understand that tips, wraps, gel, etc, are expensive; however, we ask that these products be removed from at least one finger on each hand, preferable the index or middle finger. Your fingertips are used to accurately monitor your oxygen level during surgery by a device called an oximeter.          Do's & Don'ts  Clothing  You will be most comfortable with a button front shirt and loose-fitting clothes which are easily folded. For your safety, low heeled shoes are recommended.     Valuables  Jewelry, including body piercings, money and credit card should be left at home.    Hearing Aides  If you relay on a hearing aid, you should wear it to the hospital on the day of surgery so that you can hear and understand everything we need to communicate with you.     Dentures  You may be asked to remove all non-permanent dental work before your surgery. Please bring a denture cup with you.     Contact Lenses  Wear glasses when possible. If contact lenses must be  worn, bring your lens case and solution. If glasses are worn, bring a case for them.    Hair, Wigs and Hairpieces  Wear your hair loose, avoiding buns, ponytails or hair pieces and you will be given a hair cover. Before going to surgery, you will be asked to remove any wigs or hairpieces and you will be given a head cover.  We will do everything we can to respect your personal appearance.     Makeup & Perfume  Makeup and perfume should not be worn the day of surgery.    Creams, Lotions & Deodorants  Cream, Lotions and deodorants should not be worn on the day of surgery.    In Case of Illness  If you develop a cold, persistent cough, sore throat, fever, or any other illness within two days of surgery, or have ongoing symptoms from any new episode of these ailments since your preoperative visit, your surgeon need to be notified.       Pain Management    Pain control after surgery helps you enjoy greater comfort while you heal. With less pain, you can start moving sooner, improve the depth of your breathing, and get your strength back quickly. You may even leave the hospital sooner. Your doctors and nurses will ask you about your pain because they want to know that everything is progressing as expected.     Pain is an uncomfortable feeling that tells you something may be wrong in your body. When there is an injury in your body, such as a surgical incision, receptor nerve cells in and beneath your skin send messages to your brain. Pain medicine blocks these messages or reduces their effect on your brain, making you less aware of pain. When pain doesn't go away, even after you take pain medicine, it may be a signal that there is a problem. Be sure to tell your doctors and nurses when you have pain. It is important that you play an active role in choosing the options available for treating your pain.     Both medical and non-medical treatments can be successful in preventing and controlling pain. Your [facility] healthcare  team will work with you in determining individualized and effective pain control, with the possibility of combining methods for greater relief. There should be no concern about becoming addicted to pain medications when you are using pain medication for pain control under the guidance of your healthcare team.     Because pain tolerance is different for each person, you play a key role in determining the best method of pain control. You are encouraged to discuss pain management with your healthcare team, which may include one or more of the following methods before, during, and after surgery.    Before Surgery  Medical treatment - Pain medicine as directed  Non-Medical - Understand what surgical procedure your doctor is performing, why it's necessary, and how it will be done. Learn deep breathing and relaxation exercises.     During Surgery  Medical treatment - Anesthetic treatments include, general anesthesia, spinal anesthesia, or nerve blocks, or pain medicine delivered by an epidural catheter or by an IV line in your arm.    After Surgery  Medical treatment - Pain control measures may include oral medication (pain pills), intra muscular injections, suppositories, or medication delivered through an IV or through an epidural catheter in your back.   Non Medical - Message, hot or cold packs, relaxation, music, or other distraction pastimes, positive thinking, or nerve stimulation (TENS).

## 2023-12-14 ENCOUNTER — PATIENT MESSAGE (OUTPATIENT)
Dept: SURGERY | Facility: CLINIC | Age: 43
End: 2023-12-14
Payer: COMMERCIAL

## 2023-12-20 ENCOUNTER — TELEPHONE (OUTPATIENT)
Dept: SURGERY | Facility: CLINIC | Age: 43
End: 2023-12-20
Payer: COMMERCIAL

## 2023-12-20 NOTE — TELEPHONE ENCOUNTER
Placed a call to patient to ensure she received the pre-op/DOSC instructions that were emailed 12/14     Pt stated she needed to provide a new email address. I updated her chart & will email to new address provided.     Will give a call back later today to discuss and answer any questions she may have.

## 2023-12-20 NOTE — PRE-PROCEDURE INSTRUCTIONS
PreOp Instructions given:   - Verbal medication information (what to hold and what to take)   - NPO guidelines given/CRS Dept  - Arrival place directions given; time to be given the day before procedure by the   Surgeon's Office 0211 DOS  - Bathing with antibacterial soap   - Don't wear any jewelry or bring any valuables AM of surgery   - No makeup or moisturizer to face   - No perfume/cologne, powder, lotions or aftershave   Pt. verbalized understanding.   Pt denies any h/o Anesthesia/Sedation complications or side effects.

## 2023-12-21 ENCOUNTER — TELEPHONE (OUTPATIENT)
Dept: SURGERY | Facility: CLINIC | Age: 43
End: 2023-12-21
Payer: COMMERCIAL

## 2023-12-21 NOTE — TELEPHONE ENCOUNTER
Placed a call to pt for the reviewing of instructions emailed yesterday to updated email address, address any concerns, & answer any questions she may have at this time as it relates to her sphincterotomy scheduled for 12/26.     Pt only wanted to confirm that no prep was necessary for procedure. Briefly reviewed the important points within the paperwork & ensured she new where to go for DOSC.    Arrival time confirmed during our call.    No further needs identified upon the completion of our phone conversation.

## 2023-12-25 ENCOUNTER — ANESTHESIA EVENT (OUTPATIENT)
Dept: SURGERY | Facility: HOSPITAL | Age: 43
End: 2023-12-25
Payer: COMMERCIAL

## 2023-12-25 RX ORDER — ACETAMINOPHEN 500 MG
1000 TABLET ORAL ONCE
Status: COMPLETED | OUTPATIENT
Start: 2023-12-26 | End: 2023-12-26

## 2023-12-26 ENCOUNTER — HOSPITAL ENCOUNTER (OUTPATIENT)
Facility: HOSPITAL | Age: 43
Discharge: HOME | End: 2023-12-26
Attending: COLON & RECTAL SURGERY | Admitting: COLON & RECTAL SURGERY
Payer: COMMERCIAL

## 2023-12-26 ENCOUNTER — ANESTHESIA (OUTPATIENT)
Dept: SURGERY | Facility: HOSPITAL | Age: 43
End: 2023-12-26
Payer: COMMERCIAL

## 2023-12-26 VITALS
RESPIRATION RATE: 16 BRPM | HEART RATE: 80 BPM | HEIGHT: 64 IN | OXYGEN SATURATION: 98 % | TEMPERATURE: 98 F | BODY MASS INDEX: 22.71 KG/M2 | DIASTOLIC BLOOD PRESSURE: 74 MMHG | SYSTOLIC BLOOD PRESSURE: 119 MMHG | WEIGHT: 133 LBS

## 2023-12-26 DIAGNOSIS — K60.2 ANAL FISSURE: Primary | ICD-10-CM

## 2023-12-26 DIAGNOSIS — N81.11 CYSTOCELE, MIDLINE: ICD-10-CM

## 2023-12-26 DIAGNOSIS — N81.4 CYSTOCELE WITH PROLAPSE: ICD-10-CM

## 2023-12-26 PROBLEM — R32 URINARY INCONTINENCE: Status: ACTIVE | Noted: 2023-12-26

## 2023-12-26 LAB
ABO + RH BLD: NORMAL
B-HCG UR QL: NEGATIVE
BLD GP AB SCN CELLS X3 SERPL QL: NORMAL
CTP QC/QA: YES
SPECIMEN OUTDATE: NORMAL

## 2023-12-26 PROCEDURE — 86900 BLOOD TYPING SEROLOGIC ABO: CPT | Performed by: OBSTETRICS & GYNECOLOGY

## 2023-12-26 PROCEDURE — 46255 PR HEMORRHOIDECTOMY,INT/EXT,1 COLUMN/GROUP: ICD-10-PCS | Mod: 51,,, | Performed by: COLON & RECTAL SURGERY

## 2023-12-26 PROCEDURE — 25000003 PHARM REV CODE 250: Performed by: COLON & RECTAL SURGERY

## 2023-12-26 PROCEDURE — L8606 SYNTHETIC IMPLNT URINARY 1ML: HCPCS | Performed by: COLON & RECTAL SURGERY

## 2023-12-26 PROCEDURE — 25000003 PHARM REV CODE 250

## 2023-12-26 PROCEDURE — 57240: ICD-10-PCS | Mod: ,,, | Performed by: OBSTETRICS & GYNECOLOGY

## 2023-12-26 PROCEDURE — 63600175 PHARM REV CODE 636 W HCPCS

## 2023-12-26 PROCEDURE — 81025 URINE PREGNANCY TEST: CPT | Performed by: OBSTETRICS & GYNECOLOGY

## 2023-12-26 PROCEDURE — 46255 REMOVE INT/EXT HEM 1 GROUP: CPT | Mod: 51,,, | Performed by: COLON & RECTAL SURGERY

## 2023-12-26 PROCEDURE — 57240 ANTERIOR COLPORRHAPHY: CPT | Mod: ,,, | Performed by: OBSTETRICS & GYNECOLOGY

## 2023-12-26 PROCEDURE — 46505 CHEMODENERVATION ANAL MUSC: CPT | Mod: 50,,, | Performed by: COLON & RECTAL SURGERY

## 2023-12-26 PROCEDURE — 63600175 PHARM REV CODE 636 W HCPCS: Mod: JZ,JG | Performed by: COLON & RECTAL SURGERY

## 2023-12-26 PROCEDURE — 88304 TISSUE EXAM BY PATHOLOGIST: CPT | Mod: 26,,, | Performed by: STUDENT IN AN ORGANIZED HEALTH CARE EDUCATION/TRAINING PROGRAM

## 2023-12-26 PROCEDURE — 37000009 HC ANESTHESIA EA ADD 15 MINS: Performed by: COLON & RECTAL SURGERY

## 2023-12-26 PROCEDURE — 36000706: Performed by: COLON & RECTAL SURGERY

## 2023-12-26 PROCEDURE — 51715 ENDOSCOPIC INJECTION/IMPLANT: CPT | Mod: 51,,, | Performed by: OBSTETRICS & GYNECOLOGY

## 2023-12-26 PROCEDURE — 46505 PR CHEMODENERVATION ANAL SPHINCTER: ICD-10-PCS | Mod: 50,,, | Performed by: COLON & RECTAL SURGERY

## 2023-12-26 PROCEDURE — 36415 COLL VENOUS BLD VENIPUNCTURE: CPT | Performed by: COLON & RECTAL SURGERY

## 2023-12-26 PROCEDURE — 25000003 PHARM REV CODE 250: Performed by: OBSTETRICS & GYNECOLOGY

## 2023-12-26 PROCEDURE — 88304 PR  SURG PATH,LEVEL III: ICD-10-PCS | Mod: 26,,, | Performed by: STUDENT IN AN ORGANIZED HEALTH CARE EDUCATION/TRAINING PROGRAM

## 2023-12-26 PROCEDURE — 37000008 HC ANESTHESIA 1ST 15 MINUTES: Performed by: COLON & RECTAL SURGERY

## 2023-12-26 PROCEDURE — 51715 PR ENDOSCOPIC INJECTION/IMPLANT: ICD-10-PCS | Mod: 51,,, | Performed by: OBSTETRICS & GYNECOLOGY

## 2023-12-26 PROCEDURE — 71000044 HC DOSC ROUTINE RECOVERY FIRST HOUR: Performed by: COLON & RECTAL SURGERY

## 2023-12-26 PROCEDURE — D9220A PRA ANESTHESIA: Mod: ,,, | Performed by: ANESTHESIOLOGY

## 2023-12-26 PROCEDURE — 27201423 OPTIME MED/SURG SUP & DEVICES STERILE SUPPLY: Performed by: COLON & RECTAL SURGERY

## 2023-12-26 PROCEDURE — D9220A PRA ANESTHESIA: ICD-10-PCS | Mod: ,,, | Performed by: ANESTHESIOLOGY

## 2023-12-26 PROCEDURE — 88304 TISSUE EXAM BY PATHOLOGIST: CPT | Performed by: STUDENT IN AN ORGANIZED HEALTH CARE EDUCATION/TRAINING PROGRAM

## 2023-12-26 PROCEDURE — 71000015 HC POSTOP RECOV 1ST HR: Performed by: COLON & RECTAL SURGERY

## 2023-12-26 PROCEDURE — 71000016 HC POSTOP RECOV ADDL HR: Performed by: COLON & RECTAL SURGERY

## 2023-12-26 PROCEDURE — 36000707: Performed by: COLON & RECTAL SURGERY

## 2023-12-26 DEVICE — SYS BULKAMID URETH BULKING 2ML: Type: IMPLANTABLE DEVICE | Site: URINARY BLADDER | Status: FUNCTIONAL

## 2023-12-26 RX ORDER — LIDOCAINE HYDROCHLORIDE 10 MG/ML
INJECTION, SOLUTION EPIDURAL; INFILTRATION; INTRACAUDAL; PERINEURAL
Status: DISCONTINUED | OUTPATIENT
Start: 2023-12-26 | End: 2023-12-26 | Stop reason: HOSPADM

## 2023-12-26 RX ORDER — PROPOFOL 10 MG/ML
VIAL (ML) INTRAVENOUS
Status: DISCONTINUED | OUTPATIENT
Start: 2023-12-26 | End: 2023-12-26

## 2023-12-26 RX ORDER — HALOPERIDOL 5 MG/ML
0.5 INJECTION INTRAMUSCULAR EVERY 10 MIN PRN
Status: DISCONTINUED | OUTPATIENT
Start: 2023-12-26 | End: 2023-12-26 | Stop reason: HOSPADM

## 2023-12-26 RX ORDER — FENTANYL CITRATE 50 UG/ML
INJECTION, SOLUTION INTRAMUSCULAR; INTRAVENOUS
Status: DISCONTINUED | OUTPATIENT
Start: 2023-12-26 | End: 2023-12-26

## 2023-12-26 RX ORDER — SODIUM CHLORIDE 0.9 % (FLUSH) 0.9 %
10 SYRINGE (ML) INJECTION
Status: DISCONTINUED | OUTPATIENT
Start: 2023-12-26 | End: 2023-12-26 | Stop reason: HOSPADM

## 2023-12-26 RX ORDER — PHENYLEPHRINE HYDROCHLORIDE 10 MG/ML
INJECTION INTRAVENOUS
Status: DISCONTINUED | OUTPATIENT
Start: 2023-12-26 | End: 2023-12-26

## 2023-12-26 RX ORDER — POLYETHYLENE GLYCOL 3350 17 G/17G
17 POWDER, FOR SOLUTION ORAL DAILY
Qty: 510 G | Refills: 0 | Status: SHIPPED | OUTPATIENT
Start: 2023-12-26

## 2023-12-26 RX ORDER — OXYCODONE HYDROCHLORIDE 5 MG/1
5 TABLET ORAL EVERY 6 HOURS PRN
Qty: 25 TABLET | Refills: 0 | Status: SHIPPED | OUTPATIENT
Start: 2023-12-26

## 2023-12-26 RX ORDER — HYDROMORPHONE HYDROCHLORIDE 1 MG/ML
0.2 INJECTION, SOLUTION INTRAMUSCULAR; INTRAVENOUS; SUBCUTANEOUS EVERY 5 MIN PRN
Status: DISCONTINUED | OUTPATIENT
Start: 2023-12-26 | End: 2023-12-26 | Stop reason: HOSPADM

## 2023-12-26 RX ORDER — MUPIROCIN 20 MG/G
OINTMENT TOPICAL
Status: DISPENSED | OUTPATIENT
Start: 2023-12-26

## 2023-12-26 RX ORDER — MIDAZOLAM HYDROCHLORIDE 1 MG/ML
INJECTION, SOLUTION INTRAMUSCULAR; INTRAVENOUS
Status: DISCONTINUED | OUTPATIENT
Start: 2023-12-26 | End: 2023-12-26

## 2023-12-26 RX ORDER — OXYCODONE HYDROCHLORIDE 5 MG/1
5 TABLET ORAL ONCE
Status: DISCONTINUED | OUTPATIENT
Start: 2023-12-26 | End: 2023-12-26 | Stop reason: HOSPADM

## 2023-12-26 RX ORDER — ONDANSETRON 2 MG/ML
INJECTION INTRAMUSCULAR; INTRAVENOUS
Status: DISCONTINUED | OUTPATIENT
Start: 2023-12-26 | End: 2023-12-26

## 2023-12-26 RX ORDER — LIDOCAINE HYDROCHLORIDE 20 MG/ML
INJECTION INTRAVENOUS
Status: DISCONTINUED | OUTPATIENT
Start: 2023-12-26 | End: 2023-12-26

## 2023-12-26 RX ORDER — DEXAMETHASONE SODIUM PHOSPHATE 4 MG/ML
INJECTION, SOLUTION INTRA-ARTICULAR; INTRALESIONAL; INTRAMUSCULAR; INTRAVENOUS; SOFT TISSUE
Status: DISCONTINUED | OUTPATIENT
Start: 2023-12-26 | End: 2023-12-26

## 2023-12-26 RX ORDER — CEFAZOLIN 2 G/1
INJECTION, POWDER, FOR SOLUTION INTRAMUSCULAR; INTRAVENOUS
Status: DISCONTINUED | OUTPATIENT
Start: 2023-12-26 | End: 2023-12-26

## 2023-12-26 RX ORDER — BUPIVACAINE HYDROCHLORIDE AND EPINEPHRINE 5; 5 MG/ML; UG/ML
INJECTION, SOLUTION EPIDURAL; INTRACAUDAL; PERINEURAL
Status: DISCONTINUED | OUTPATIENT
Start: 2023-12-26 | End: 2023-12-26 | Stop reason: HOSPADM

## 2023-12-26 RX ORDER — SODIUM CHLORIDE 9 MG/ML
INJECTION, SOLUTION INTRAVENOUS CONTINUOUS
Status: ACTIVE | OUTPATIENT
Start: 2023-12-26

## 2023-12-26 RX ORDER — FAMOTIDINE 20 MG/1
20 TABLET, FILM COATED ORAL
Status: ACTIVE | OUTPATIENT
Start: 2023-12-26

## 2023-12-26 RX ORDER — ROCURONIUM BROMIDE 10 MG/ML
INJECTION, SOLUTION INTRAVENOUS
Status: DISCONTINUED | OUTPATIENT
Start: 2023-12-26 | End: 2023-12-26

## 2023-12-26 RX ORDER — MUPIROCIN 20 MG/G
OINTMENT TOPICAL
Status: ACTIVE | OUTPATIENT
Start: 2023-12-26

## 2023-12-26 RX ORDER — IBUPROFEN 600 MG/1
600 TABLET ORAL EVERY 6 HOURS PRN
Qty: 30 TABLET | Refills: 0 | Status: SHIPPED | OUTPATIENT
Start: 2023-12-26

## 2023-12-26 RX ADMIN — GLYCOPYRROLATE 0.2 MG: 0.2 INJECTION, SOLUTION INTRAMUSCULAR; INTRAVENOUS at 08:12

## 2023-12-26 RX ADMIN — SODIUM CHLORIDE: 0.9 INJECTION, SOLUTION INTRAVENOUS at 08:12

## 2023-12-26 RX ADMIN — ONDANSETRON 8 MG: 2 INJECTION INTRAMUSCULAR; INTRAVENOUS at 09:12

## 2023-12-26 RX ADMIN — MUPIROCIN: 20 OINTMENT TOPICAL at 06:12

## 2023-12-26 RX ADMIN — CEFAZOLIN 2 G: 2 INJECTION, POWDER, FOR SOLUTION INTRAMUSCULAR; INTRAVENOUS at 07:12

## 2023-12-26 RX ADMIN — ACETAMINOPHEN 1000 MG: 500 TABLET ORAL at 06:12

## 2023-12-26 RX ADMIN — HYDROMORPHONE HYDROCHLORIDE 0.2 MG: 1 INJECTION, SOLUTION INTRAMUSCULAR; INTRAVENOUS; SUBCUTANEOUS at 10:12

## 2023-12-26 RX ADMIN — MIDAZOLAM HYDROCHLORIDE 2 MG: 1 INJECTION, SOLUTION INTRAMUSCULAR; INTRAVENOUS at 06:12

## 2023-12-26 RX ADMIN — PHENYLEPHRINE HYDROCHLORIDE 50 MCG: 10 INJECTION INTRAVENOUS at 09:12

## 2023-12-26 RX ADMIN — DEXAMETHASONE SODIUM PHOSPHATE 8 MG: 4 INJECTION, SOLUTION INTRAMUSCULAR; INTRAVENOUS at 07:12

## 2023-12-26 RX ADMIN — SUGAMMADEX 200 MG: 100 INJECTION, SOLUTION INTRAVENOUS at 09:12

## 2023-12-26 RX ADMIN — PHENYLEPHRINE HYDROCHLORIDE 50 MCG: 10 INJECTION INTRAVENOUS at 07:12

## 2023-12-26 RX ADMIN — FENTANYL CITRATE 100 MCG: 50 INJECTION, SOLUTION INTRAMUSCULAR; INTRAVENOUS at 07:12

## 2023-12-26 RX ADMIN — PHENYLEPHRINE HYDROCHLORIDE 100 MCG: 10 INJECTION INTRAVENOUS at 08:12

## 2023-12-26 RX ADMIN — PROPOFOL 100 MG: 10 INJECTION, EMULSION INTRAVENOUS at 07:12

## 2023-12-26 RX ADMIN — SODIUM CHLORIDE: 0.9 INJECTION, SOLUTION INTRAVENOUS at 07:12

## 2023-12-26 RX ADMIN — ROCURONIUM BROMIDE 40 MG: 10 INJECTION, SOLUTION INTRAVENOUS at 07:12

## 2023-12-26 RX ADMIN — LIDOCAINE HYDROCHLORIDE 60 MG: 20 INJECTION INTRAVENOUS at 07:12

## 2023-12-26 RX ADMIN — PHENYLEPHRINE HYDROCHLORIDE 50 MCG: 10 INJECTION INTRAVENOUS at 08:12

## 2023-12-26 NOTE — BRIEF OP NOTE
[facility] - Surgery ([facility])  Brief Operative Note    Surgery Date: 12/26/2023     Surgeon(s) and Role:  Panel 1:     * Prabha Dow MD - Primary     * Obie Bhatti MD - Fellow  Panel 2:     * Maria Ines Castanon MD - Primary    Assisting Surgeon: None    Pre-op Diagnosis:  Cystocele, midline [N81.11]  Stress incontinence [N39.3]  Anal fissure [K60.2]    Post-op Diagnosis:  Post-Op Diagnosis Codes:     * Cystocele, midline [N81.11]     * Stress incontinence [N39.3]     * Anal fissure [K60.2]    Procedure(s) (LRB):  INJECTION, BOTULINUM TOXIN, TYPE A (N/A)  COLPORRHAPHY, ANTERIOR (N/A)  CYSTOSCOPY, WITH PERIURETHRAL BULKING AGENT INJECTION (N/A)  SLING, MIDURETHRAL (N/A)  HEMORRHOIDECTOMY    Anesthesia: General    Operative Findings: No obvious fissure seen. Friable right anterior hemorrhoid column with recent evidence of bleeding. Hemorrhoidectomy performed and 100 U of botox injected into internal sphincter.     Estimated Blood Loss: minimal  Specimens:   Specimen (24h ago, onward)      None              Discharge Note    OUTCOME: Patient tolerated treatment/procedure well without complication and is now ready for discharge.    DISPOSITION: Home or Self Care    FINAL DIAGNOSIS:  <principal problem not specified>    FOLLOWUP: In clinic    DISCHARGE INSTRUCTIONS:    Discharge Procedure Orders   Diet Adult Regular     Notify your health care provider if you experience any of the following:  temperature >100.4     Notify your health care provider if you experience any of the following:  persistent nausea and vomiting or diarrhea     Notify your health care provider if you experience any of the following:  severe uncontrolled pain     Notify your health care provider if you experience any of the following:  redness, tenderness, or signs of infection (pain, swelling, redness, odor or green/yellow discharge around incision site)     Activity as tolerated

## 2023-12-26 NOTE — OP NOTE
[facility]  Operative Note    Date: 12/26/2023    Name: Manuela Quinones    MRN: [id number]    Pre-Op Diagnosis: Anal pain and bleeding    Post-Op Diagnosis: Same    Procedure(s) Performed:   Single-column excisional hemorrhoidectomy  Injection of Botox    Specimen(s): Right anterior hemorrhoid    Staff Surgeon: Prabha Dow    Assistant Surgeon: Kemar Bhatti (fellow)    Anesthesia: General    Indications: Manuela Quinones is a 43 y.o. female with a history of anal pain and bleeding. She underwent banding without improvement in symptoms, and colonoscopy was normal.  She developed a small anterior fissure after her colonoscopy. She is undergoing planned repair of her vaginal prolapse today and requested a combined procedure for ongoing pain and bleeding.    Details of procedure: The patient was taken to the operating room and transferred to the OR table.  SCDs were applied and she was placed under anesthesia. Her anus was prepped and draped in the standard sterile fashion. A time-out was performed.  External exam revealed chronic prolapse of the right anterior hemorrhoid column with an associated skin tag.  A lightet [place] anoscope was introduced and the anal canal was examined. Her previously seen anterior midline fissure had healed.  The only abnormality was the chronically prolapsed right anterior column, which had engorgement of the internal hemorrhoid vessels.  I felt that this was likely the source of her ongoing symptoms, and was persistent despite prior banding.  Since I had also seen a small fissure on her in the past, we elected to proceed with excision of the right anterior column with injection of Botox. We began by excising the external skin tag, and carefully dissected the external and internal hemorrhoid off of the internal sphincter fibers.  The base of the hemorrhoid was ligated with a 2-0 Chronic suture.  The hemorrhoid was excised and passed off for pathology. We confirmed that  we had good hemostasis, and then ran the mucosa closed with the Chromic. We diluted 100units of Botox ibnto 3cc of sterile saline, and injected 1cc each into the anterior midline internal sphincter, right lateral internal sphincter, and left lateral internal sphincter. That concluded our portion of the procedure.  The patient remained under anesthesia for Dr Castanon's portion.     Estimated Blood Loss: 5mL    Drains/Implants: None    Wound Class: IV    Prabha Dow

## 2023-12-26 NOTE — ANESTHESIA PREPROCEDURE EVALUATION
[facility]  Anesthesia Pre-Operative Evaluation         Patient Name: Manuela Quinones  YOB: 1980  MRN: [id number]    SUBJECTIVE:     Pre-operative evaluation for Procedure(s) (LRB):  SPHINCTEROTOMY, ANAL (N/A)  INJECTION, BOTULINUM TOXIN, TYPE A (N/A)  COLPORRHAPHY, ANTERIOR (N/A)  CYSTOSCOPY, WITH PERIURETHRAL BULKING AGENT INJECTION (N/A)  SLING, MIDURETHRAL (N/A)     12/25/2023    Manuela Quinones is a 43 y.o. female w/ a significant PMHx of  cystocele, stress incontinence, anal fissures    Patient now presents for the above procedure(s).    Echo Summary  No results found for this or any previous visit.       Prev airway: None documented      There is no problem list on file for this patient.      Review of patient's allergies indicates:  No Known Allergies    Current Inpatient Medications:      No current facility-administered medications on file prior to encounter.     Current Outpatient Medications on File Prior to Encounter   Medication Sig Dispense Refill    ferrous sulfate (FEOSOL) 325 mg (65 mg iron) Tab tablet Take 325 mg by mouth daily with breakfast.      hydrocortisone (ANUSOL-HC) 2.5 % rectal cream Place rectally 2 (two) times daily. (Patient not taking: Reported on 11/9/2023) 28 g 2    LIDOcaine (XYLOCAINE) 5 % Oint ointment Apply topically as needed (anal pain). (Patient not taking: Reported on 10/24/2023) 30 g 2       Past Surgical History:   Procedure Laterality Date    COLONOSCOPY N/A 08/14/2023    Procedure: COLONOSCOPY;  Surgeon: Prabha Dow MD;  Location: [address];  Service: Endoscopy;  Laterality: N/A;  inst via portal  possible banding  pre call confirmed    COLONOSCOPY, WITH HEMORRHOID BANDING      TUBAL LIGATION         Social History:  Tobacco Use: Low Risk  (11/9/2023)    Patient History     Smoking Tobacco Use: Never     Smokeless Tobacco Use: Never     Passive Exposure: Not on file      Alcohol Use: Not At Risk (9/28/2023)    AUDIT-C      Frequency of Alcohol Consumption: Monthly or less     Average Number of Drinks: 1 or 2     Frequency of Binge Drinking: Never        OBJECTIVE:     Vital Signs Range (Last 24H):         Significant Labs:  Lab Results   Component Value Date    WBC 6.09 03/17/2023    HGB 10.8 (L) 03/17/2023    HCT 33.6 (L) 03/17/2023     03/17/2023    CHOL 241 (H) 03/17/2023    TRIG 141 03/17/2023    HDL 53 03/17/2023    ALT 12 03/17/2023    AST 15 03/17/2023     (L) 03/17/2023    K 4.0 03/17/2023     03/17/2023    CREATININE 0.8 03/17/2023    BUN 21 (H) 03/17/2023    CO2 23 03/17/2023    TSH 3.396 03/17/2023    HGBA1C 5.3 03/17/2023       Diagnostic Studies: No relevant studies.    EKG:   Results for orders placed or performed in visit on 03/17/23   EKG 12-lead    Collection Time: 03/17/23  8:32 AM    Narrative    Test Reason : Z00.00,    Vent. Rate : 069 BPM     Atrial Rate : 069 BPM     P-R Int : 128 ms          QRS Dur : 086 ms      QT Int : 406 ms       P-R-T Axes : 075 022 038 degrees     QTc Int : 435 ms    Normal sinus rhythm  Normal ECG  When compared with ECG of 16-JAN-2023 19:45,  No significant change was found  Confirmed by Seth Mcneill MD ([id number]) on 3/17/2023 10:50:01 AM    Referred By: ELIZABETH SCOTT           Confirmed By:Seth Mcneill MD       2D ECHO:  TTE:  No results found for this or any previous visit.    SUMAYA:  No results found for this or any previous visit.    ASSESSMENT/PLAN:           Pre-op Assessment    I have reviewed the Patient Summary Reports.     I have reviewed the Nursing Notes. I have reviewed the NPO Status.   I have reviewed the Medications.     Review of Systems  Anesthesia Hx:  No problems with previous Anesthesia                Social:  Non-Smoker, No Alcohol Use       Hematology/Oncology:  Hematology Normal   Oncology Normal                                   EENT/Dental:  EENT/Dental Normal           Cardiovascular:  Exercise tolerance: good                NYHA  Classification I                           Pulmonary:  Pulmonary Normal                       Renal/:  Renal/ Normal                 Hepatic/GI:  Hepatic/GI Normal       Rectal bleeding          Musculoskeletal:  Musculoskeletal Normal                Neurological:  Neurology Normal                                      Endocrine:   Hypothyroidism          Dermatological:  Skin Normal    Psych:  Psychiatric Normal                    Physical Exam  General: Well nourished    Airway:  Mallampati: I / I  Mouth Opening: Normal  TM Distance: Normal  Tongue: Normal  Neck ROM: Normal ROM    Dental:  Intact        Anesthesia Plan  Type of Anesthesia, risks & benefits discussed:    Anesthesia Type: Gen ETT  Intra-op Monitoring Plan: Standard ASA Monitors  Post Op Pain Control Plan: multimodal analgesia and IV/PO Opioids PRN  Induction:  IV  Airway Plan: Direct and Video  Informed Consent: Informed consent signed with the Patient and all parties understand the risks and agree with anesthesia plan.  All questions answered.   ASA Score: 1  Day of Surgery Review of History & Physical: H&P Update referred to the surgeon/provider.    Ready For Surgery From Anesthesia Perspective.     .

## 2023-12-26 NOTE — H&P
Manuela Quinones is 43 y.o.  presenting for scheduled anterior repair, perineorrhaphy, Bulkamid injections and cystoscopy.    Temp:  [98.6 °F (37 °C)] 98.6 °F (37 °C)  Pulse:  [72] 72  Resp:  [16] 16  SpO2:  [100 %] 100 %  BP: (110)/(62) 110/62    General: NAD, alert, oriented, cooperative  HEENT: NCAT, EOM grossly intact  Lungs: Normal WOB  Heart: regular rate  Abdomen: soft, nondistended, nontender, no rebound or guarding    Consents in chart. All questions answered and concerns addressed. To OR for planned procedure.      ---------------------------------------------------------------------------    Maria Ines Castanon MD  Physician  Specialty: Obstetrics and Gynecology     Progress Notes     Signed     Encounter Date: 2023  Creation Time: 2023  2:18 PM             Chief Complaint   Patient presents with    Follow-up         HPI: Patient is a 43 y.o. female  presents today for a follow up visit after urodynamic testing. Patient is primarily bothered by her rectal pain. She sees Dr. Dow and they have discussed either performing botox injections or a lateral internal anal sphincterotomy for her anal fissure.      Patient states that if she has surgery with Dr. Dow she would like to proceed with an anterior repair and an anti-incontinence surgery. Would like to try to have her surgery this year due to meeting her insurance deductible.      REVIEW OF SYSTEMS:  A full 14-point ROS was performed and was significant for those  mentioned in the HPI.     The following portions of the patient's history were reviewed and updated as appropriate: allergies, current medications, past medical history, past surgical history and problem list.     PHYSICAL EXAMINATION         Vitals:     23 1411   BP: 108/77   Pulse: 70         General: Healthy in appearance, Well nourished, Affect Normal, NAD.             No visits with results within 1 Day(s) from this visit.   Latest known visit with results is:    Procedure visit on 10/24/2023   Component Date Value Ref Range Status    Color, UA 10/24/2023 Yellow    Final    pH, UA 10/24/2023 5    Final    WBC, UA 10/24/2023 neg    Final    Nitrite, UA 10/24/2023 neg    Final    Protein, POC 10/24/2023 neg    Final    Glucose, UA 10/24/2023 normal    Final    Ketones, UA 10/24/2023 neg    Final    Urobilinogen, UA 10/24/2023 normal    Final    Bilirubin, POC 10/24/2023 neg    Final    Blood, UA 10/24/2023 neg    Final    Clarity, UA 10/24/2023 Clear    Final    Spec Grav UA 10/24/2023 1.010    Final         ASSESSMENT & PLAN:  Cystocele, midline     Stress incontinence     Anal fissure     42 yo with stress urinary incontinence and stage 2 anterior vaginal prolapse presented for discussion of her urodynamic testing. Reviewed that her testing showed that she had an above normal bladder capacity. Her sensations are normal and she leaked urine at 450 mL for the first time.   She would like to proceed with an anterior repair, perineorrhaphy, Bulkamid injections and cystoscopy. Discussed that it is possible that her insurance may not cover the Bulkamid and we may have to do a sling since she has not done any pelvic floor PT. She would like to have surgery done this year as she has met her deductible and we reviewed that I will have to coordinate with Dr. Dow and see what time we can find.   We reviewed the risks and benefits of the planned surgical procedure which included but were not limited to the potential conversion to an open or vaginal procedure, pelvic pain, pain with intercourse, infection, bleeding, need for transfusion, risks of transfusion, injury to bowel, urinary tract, blood vessels or nerves, urinary retention, prolonged catheterization, mesh erosion or infection, no change in symptoms, new onset LOUIS or UUI, recurrence of prolapse, change in urine stream, recurrent UTI's, fistula formation, need for ostomy (colostomy/ileostomy/ nephrostomy/ ileostomy) and need  for further surgery. Patient was provided the opportunity to ask questions, All questions were answered. The informed consent was signed and a copy was provided to the patient.      All questions were answered today. The patient was encouraged to contact the office as needed with any additional questions or concerns.      Total time spent on visit was 30 minutes.  This includes face to face time and non-face to face time preparing to see the patient (eg, review of tests), Obtaining and/or reviewing separately obtained history, Documenting clinical information in the electronic or other health record, Independently interpreting resultsand communicating results to the patient/family/caregiver, or Care coordination.     Maria Ines Castanon MD         Electronically signed by Maria Ines Castanon MD at 11/9/2023  4:05 PM

## 2023-12-26 NOTE — DISCHARGE SUMMARY
"Discharge Summary  Gynecology      Admit Date: 2023    Discharge Date and Time: 2023     Attending Physician: Maria Ines Castanon MD    Principal Diagnoses: Cystocele with prolapse    Active Hospital Problems    Diagnosis  POA    *Cystocele with prolapse [N81.4]  Yes    Urinary incontinence [R32]  Yes      Resolved Hospital Problems   No resolved problems to display.       Procedures: Procedure(s) (LRB):  INJECTION, BOTULINUM TOXIN, TYPE A (N/A)  COLPORRHAPHY, ANTERIOR (N/A)  CYSTOSCOPY, WITH PERIURETHRAL BULKING AGENT INJECTION (N/A)  HEMORRHOIDECTOMY  PERINEOPLASTY    Discharged Condition: good    Hospital Course:   Manuela Quinones is a 43 y.o. y.o.  female who presented on 2023   for anterior repair, perineorrhaphy, Bulkamid injections and cystoscopy for the treatment of cystocele, urinary incontinence. She is also s/p hemorrhoidectomy and botox injection by [facility] (Dr. Dow). Patient tolerated procedure. Post-operative course was uncomplicated.  On day of discharge, patient was ambulating, and tolerating a regular diet without difficulty. Pain was well controlled on PO medication. Patient was able to void spontaneously, however post-void bladder scan showed 250-300cc. Patient was in and out catheterized with a 10 [place] red rubber catheter, which yielded 275cc clear yellow urine at 1300. Patient and her  were counseled extensively regarding instructions to report to the emergency room if patient has not voided by 1900, and to advise ED team to use 10 [place] catheter to decompress the bladder (given paper with these instructions as well). Patient and  verbalize understanding and all questions were answered.    She was discharged home on POD#0 in stable condition with instructions to follow up with Dr. Castanon in 2 weeks and then again at 6 weeks post-op.     Consults: None    Significant Diagnostic Studies:  No results for input(s): "WBC", "HGB", "HCT", "MCV", "PLT" in the last 168 " hours.     Treatments:   1. Surgery as above    Disposition: Home or Self Care    Patient Instructions:   Current Discharge Medication List        START taking these medications    Details   ibuprofen (ADVIL,MOTRIN) 600 MG tablet Take 1 tablet (600 mg total) by mouth every 6 (six) hours as needed for Pain.  Qty: 30 tablet, Refills: 0      oxyCODONE (ROXICODONE) 5 MG immediate release tablet Take 1 tablet (5 mg total) by mouth every 6 (six) hours as needed for Pain.  Qty: 25 tablet, Refills: 0    Comments: Quantity prescribed more than 7 day supply? No      polyethylene glycol (GLYCOLAX) 17 gram/dose powder Use cap to measure 17 g, mix with liquid, and take by mouth once daily for 24 days  Qty: 510 g, Refills: 0      psyllium (HYDROCIL) packet Take 1 packet by mouth once daily.  Qty: 30 packet, Refills: 6           CONTINUE these medications which have NOT CHANGED    Details   LIDOcaine (XYLOCAINE) 5 % Oint ointment Apply topically as needed (anal pain).  Qty: 30 g, Refills: 2    Associated Diagnoses: Anal pain      ferrous sulfate (FEOSOL) 325 mg (65 mg iron) Tab tablet Take 325 mg by mouth daily with breakfast.           STOP taking these medications       hydrocortisone (ANUSOL-HC) 2.5 % rectal cream Comments:   Reason for Stopping:               Discharge Procedure Orders   Diet Adult Regular     Diet Adult Regular     Notify your health care provider if you experience any of the following:  temperature >100.4     Notify your health care provider if you experience any of the following:  persistent nausea and vomiting or diarrhea     Notify your health care provider if you experience any of the following:  severe uncontrolled pain     Notify your health care provider if you experience any of the following:  redness, tenderness, or signs of infection (pain, swelling, redness, odor or green/yellow discharge around incision site)     Lifting restrictions   Order Comments: Avoid lifting more than 10 pounds (or a  gallon of milk) for 72 hours.     No driving until:   Order Comments: Do not drive until you are no longer taking narcotic pain medications and you feel comfortable stepping on the brake.     Pelvic Rest   Order Comments: Nothing in the vagina including tampons and intercourse for 4 weeks.     Notify your health care provider if you experience any of the following:  temperature >100.4     Notify your health care provider if you experience any of the following:  persistent nausea and vomiting or diarrhea     Notify your health care provider if you experience any of the following:  severe uncontrolled pain     Notify your health care provider if you experience any of the following:  redness, tenderness, or signs of infection (pain, swelling, redness, odor or green/yellow discharge around incision site)     Notify your health care provider if you experience any of the following:  severe persistent headache     Notify your health care provider if you experience any of the following:  persistent dizziness, light-headedness, or visual disturbances     Notify your health care provider if you experience any of the following:  increased confusion or weakness     Notify your health care provider if you experience any of the following:   Order Comments: BLEEDING PRECAUTIONS: Please report to the Emergency Room or contact your physician if you are saturating 1 thick overnight pad per hour for 2 consecutive hours.    CONSTIPATION REMEDIES: Patients are often constipated after surgery or with use of narcotic pain medicine. Remain adequately hydrated by consuming 8 standard water bottles daily. Take a stool softener (Colace or Sennakot) daily, or Mirilax if you prefer. If you have not had a bowel movement for 3 days after surgery, or are uncomfortable and unable to pass stool, please try one or all of the following measures:  1.  Milk of Magnesia - 30 cc by mouth every 12 hours   2.  Dulcolax suppository - one suppository per  rectum every 4-6 hours   3.  Metamucil, Fibercon or other bulk former - use as directed on packaging  4.  Fleet enema     Activity as tolerated     Shower on day dressing removed (No bath)   Order Comments: Avoid submerging in water (baths, pools, hot tubs) for 4 weeks.        Follow-up Information       Maria Ines Castanon MD Follow up in 2 week(s).    Specialties: Obstetrics and Gynecology, UroGynecology   Why: post-op follow up, and again at 6 weeks post-op  Contact information:  [address] [id number] [phone]                               Florecita Barboza MD   OB/GYN PGY-2

## 2023-12-26 NOTE — OP NOTE
Rory Painter - Surgery ([facility])  Urogynecology  Operative Note    SUMMARY     Date of Procedure: 12/26/2023     Procedure: Procedure(s) (LRB):  INJECTION, BOTULINUM TOXIN, TYPE A (N/A)  COLPORRHAPHY, ANTERIOR (N/A)  CYSTOSCOPY, WITH PERIURETHRAL BULKING AGENT INJECTION (N/A)  HEMORRHOIDECTOMY  PERINEOPLASTY       Surgeon(s) and Role:  Panel 1:     * Prabha Dow MD - Primary     * Obie Bhatti MD - Fellow  Panel 2:     * Maria Ines Castanon MD - Primary     * Florecita Barboza MD - PGY-2 Resident Assist    Assisting Surgeon: None    Pre-Operative Diagnosis: Cystocele, midline [N81.11]  Stress incontinence [N39.3]  Anal fissure [K60.2]    Post-Operative Diagnosis: Post-Op Diagnosis Codes:     * Cystocele, midline [N81.11]     * Stress incontinence [N39.3]     * Anal fissure [K60.2]    Anesthesia: General    Operative Findings (including complications, if any): Moderate trabeculations. Bilateral ureteral efflux. Normal appearing bladder and urethra without lesions.    Description of Technical Procedures:     Patient was taken to the Operating room, placed in dorsal lithotomy and prepped and draped in the normal sterile fashion. Anesthesia was administered and found to be adequate. A gomez catheter was placed to drain the bladder. EUA revealed Stage 2 anterior wall prolapse and introital laxity.     A Lone star retractor was used to aid with retraction. Two Allis clamps were used to grasp the middle portion of the anterior wall defect. The anterior vaginal epithelium was infiltrated with a local anesthetic. Using Hernandez scissors, the anterior vaginal epithelium was incised in the midline and then carried from the bladder neck to the proximal border of the defect. The vaginal epithelium was then dissected off the underlying tissue using Hernandez scissors. This was done bilaterally taking careful attention not to injure the underlying bladder. Dissection was carried out laterally towards the underside of the ischiopubic  ramus and arcus tendineous fascia pelvis. Next, using interrupted 0 Vicryl sutures, the endopelvic fascia over the bladder was plicated thus reducing the prolapse. 4 mattress stitches were placed. Afterwards, excess vaginal epithelium was trimmed and the remaining epithelium was approximated using a running 2-0 Vicryl suture.    Next, two  Allis clamps were used to kayden the hymenal edges at the posterior fourchette. Care was taken to make sure that the vaginal opening remained at least 2-3 finger breadths wide at the end of the perineorrhaphy. The perineal body and posterior vaginal epithelium was infiltrated with local anesthetic after a benson shaped area was marked on the posterior vaginal epithelium and perineal body. Using electrocautery a perineal skin incision was made. The perineal skin was dissected sharply from the underlying perineal muscles and rectovaginal septum using flynn scissors. This dissection was carried to the posterior vaginal epithelium following the previously marked benson. Afterwards the benson shaped patch of perineal skin and posterior vaginal epithelium was excised. Next the posterior vaginal epithelium was approximated using 2-0 vicryl suture until the level of the hymen. 0-vicryl suture were used to plicate the perineal muscles using interrupted stitches. The 2-0 vicryl used to plicate the vaginal epithelium was used to close the perineal skin completing the posterior repair and perineorrhaphy.     Next, the gomez catheter was removed and the bladder was surveyed using a 70 degree rigid cystoscope with the above findings noted. The bladder was drained using the cystoscope sheath and withdrawing the lens.      At the start of the bulking injection procedure, the water inflow was adjusted to produce an adequate stream. The Bulkamid needle was then placed ¾ of the way into the needle channel of the rotatable sheath and the  entire Bulkamid system was then advanced into the urethra  until the bladder is visualised and inspected. The Bulkamid needle was then advanced into the needle channel on the rotatable sheath until the tip of the sheath is adjacent to the bladder neck. The sheath was then rotated to the 7 oclock position. The needed was then extend into the bladder until the 2cm kayden on the needle is visible. The Bulkamid system was then retracted until the tip of the needle was resting on the bladder neck. The needle was then retracted into the sheath and approximately 1.5 cm from the bladder neck the Bulkamid system was pressed parallel against the urethral wall and the needle is then advanced into the submucosal tissue ensuring that the bevel of the needle was facing towards the lumen. The needle was advanced approximately 0.5cm, and the Bulkamid hydrogel was then injected until the Bulkamid cushion was visible and reached the midline of the urethral lumen. The needle was then retracted back into the rotatable sheath, and rotated to the next injection site. Subsequent injections were preformed at 5 oclock, 2 oclock and 10 oclock all along the same plane as the original injection until all (4) cushions met at the midline of the urethral lumen. 2mls Total of Bulkamid Hydrogel was used.  Approximately 300-500 cc of fluid was left in the bladder and upon completion of the procedure. The patient was taken to the recovery room where she will have a voiding trial and a PVR check after emptying. The procedure was well tolerated and EBL was minimal.  Patient will undergo a passive voiding trial in the recovery room and a bladder scan will be performed to determine residual. If an excess of greater than 100 remains then patient will be discharged with a 10 German catheter and a voiding trial will be performed in the office tomorrow or the following day.    All sponge, lap and needle counts were correct x 2 at the end of the procedure.      Dr. Castanon was present and scrubbed for the entirety of  the procedure.      Estimated Blood Loss (EBL): 100 mL           Implants: * No implants in log *    Specimens:   Specimen (24h ago, onward)       Start     Ordered    12/26/23 0820  Specimen to Pathology, Surgery Other  Once        Comments: Pre-op Diagnosis: Cystocele, midline [N81.11]Stress incontinence [N39.3]Anal fissure [K60.2]Procedure(s):INJECTION, BOTULINUM TOXIN, TYPE ACOLPORRHAPHY, ANTERIORCYSTOSCOPY, WITH PERIURETHRAL BULKING AGENT INJECTIONSLING, MIDURETHRALHEMORRHOIDECTOMY Number of specimens: 1Name of specimens: 1. Right anterior hemorrhoid- permanent     References:    Click here for ordering Quick Tip   Question Answer Comment   Procedure Type: Other    Specimen Class: Routine/Screening    Which provider would you like to cc? KRISTI CALI    Release to patient Immediate        12/26/23 0819                            Condition: Good    Disposition: PACU - hemodynamically stable.    Attestation: I was present and scrubbed for the entire procedure.    Maria Ines Castanon MD

## 2023-12-26 NOTE — ANESTHESIA PROCEDURE NOTES
Intubation    Date/Time: 12/26/2023 7:14 AM    Performed by: Aziza Macario DO  Authorized by: Ethan Reinoso MD    Intubation:     Induction:  Intravenous    Intubated:  Postinduction    Mask Ventilation:  Easy with oral airway    Attempts:  1    Attempted By:  Resident anesthesiologist    Method of Intubation:  Direct    Blade:  Simpson 2    Laryngeal View Grade: Grade I - full view of cords      Difficult Airway Encountered?: No      Complications:  None    Airway Device:  Oral endotracheal tube    Airway Device Size:  7.0    Style/Cuff Inflation:  Cuffed    Tube secured:  22    Secured at:  The lips    Placement Verified By:  Capnometry    Complicating Factors:  None    Findings Post-Intubation:  BS equal bilateral

## 2023-12-26 NOTE — ANESTHESIA POSTPROCEDURE EVALUATION
Anesthesia Post Evaluation    Patient: Manuela Quinones    Procedure(s) Performed: Procedure(s) (LRB):  INJECTION, BOTULINUM TOXIN, TYPE A (N/A)  COLPORRHAPHY, ANTERIOR (N/A)  CYSTOSCOPY, WITH PERIURETHRAL BULKING AGENT INJECTION (N/A)  HEMORRHOIDECTOMY  PERINEOPLASTY    Final Anesthesia Type: general      Level of consciousness: awake and alert  Post-procedure vital signs: reviewed and stable  Pain control: Pain has been treated.  Airway patency: patent    PONV status: Absent or treated.  Anesthetic complications: no      Cardiovascular status: hemodynamically stable  Respiratory status: unassisted  Hydration status: euvolemic                Vitals Value Taken Time   /74 12/26/23 1249   Temp 36.9 °C (98.4 °F) 12/26/23 1245   Pulse 80 12/26/23 1300   Resp 16 12/26/23 1300   SpO2 98 % 12/26/23 1300   Vitals shown include unvalidated device data.      No case tracking events are documented in the log.      Pain/Andres Score: Pain Rating Prior to Med Admin: 6 (12/26/2023 10:17 AM)  Andres Score: 10 (12/26/2023  1:15 PM)

## 2023-12-26 NOTE — TRANSFER OF CARE
"Anesthesia Transfer of Care Note    Patient: Manuela Quinones    Procedure(s) Performed: Procedure(s) (LRB):  INJECTION, BOTULINUM TOXIN, TYPE A (N/A)  COLPORRHAPHY, ANTERIOR (N/A)  CYSTOSCOPY, WITH PERIURETHRAL BULKING AGENT INJECTION (N/A)  HEMORRHOIDECTOMY  PERINEOPLASTY    Patient location: PACU    Anesthesia Type: general    Transport from OR: Transported from OR on 6-10 L/min O2 by face mask with adequate spontaneous ventilation    Post pain: adequate analgesia    Post assessment: no apparent anesthetic complications and tolerated procedure well    Post vital signs: stable    Level of consciousness: awake, alert and oriented    Nausea/Vomiting: no nausea/vomiting    Complications: none    Transfer of care protocol was followed      Last vitals: Visit Vitals  /62   Pulse 72   Temp 37 °C (98.6 °F)   Resp 16   Ht 5' 4" (1.626 m)   Wt 60.3 kg (133 lb)   LMP 12/20/2023   SpO2 100%   Breastfeeding No   BMI 22.83 kg/m²     "

## 2023-12-26 NOTE — H&P
"Colon and Rectal Surgery History and Physical    Patient name: Manuela Quinones   YOB: 1980   MRN: [id number]    Subjective:       Patient ID: Manuela Quinones is a 43 y.o. female.    Chief Complaint: No chief complaint on file.    HPI  43 y.o. female presents for treatment of fissure.  Had persistent pain after Bms following banding. This seems like the same pain she had prior to the banding.  Currently spending 3min max on the toilet. Doesn't think stool is hard or that she has to strain. Still taking the fiber supplement in the am.  Having some bleeding as well.        (9/28/2023)  Saw Jessika for pain following banding. Started Diltiazem and lidocaine ointment.     (8/14/2023)  Colonoscopy with banding     (7/12/2023)  Symptoms are pain and bleeding. Worse over the last few months. Worse after BM.  Symptoms have been present for 7 years.  Has tried topical hemorrhoid cream. Some help with these.  Associated bleeding: yes  Previous anorectal procedures: No  Spends 10-20 on toilet to have a BM.   Tried powder fiber before ([employer]), started flax seed 2 weeks ago.  Blood thinners: No  Last Colonoscopy: None  Family history of colorectal cancer or IBD: None.    Last colonoscopy - 8/14/23    Hemorrhoids found on perianal exam.                          - The examined portion of the ileum was normal.                          - The entire examined colon is normal on direct                          and retroflexion views.           Review of patient's allergies indicates:  No Known Allergies    Past Medical History:   Diagnosis Date    Anemia     "life long"    High cholesterol     Hypothyroidism     Unspecified hemorrhoids     Unspecified hemorrhoids        Past Surgical History:   Procedure Laterality Date    COLONOSCOPY N/A 08/14/2023    Procedure: COLONOSCOPY;  Surgeon: Prabha Dow MD;  Location: [facility] ([address] [place] [address]);  Service: Endoscopy;  Laterality: N/A;  inst via portal  possible banding  pre " call confirmed    COLONOSCOPY, WITH HEMORRHOID BANDING      TUBAL LIGATION         Current Facility-Administered Medications   Medication Dose Route Frequency Provider Last Rate Last Admin    acetaminophen tablet 1,000 mg  1,000 mg Oral Once Aziza Macario DO           Family History   Problem Relation Age of Onset    Hyperlipidemia Mother     Hyperlipidemia Father     Gout Father     Hyperlipidemia Brother     No Known Problems Brother     Lung cancer Maternal Uncle     Breast cancer Cousin     Colon cancer Neg Hx     Diabetes Neg Hx     Hypertension Neg Hx     Ovarian cancer Neg Hx     Stroke Neg Hx        Social History     Socioeconomic History    Marital status:     Number of children: 3   Tobacco Use    Smoking status: Never    Smokeless tobacco: Never   Substance and Sexual Activity    Alcohol use: Yes     Comment: socially    Drug use: Never    Sexual activity: Yes     Partners: Male     Birth control/protection: See Surgical Hx   Social History Narrative    Lives with spouse, child (6 yo daughter), and brother-in law's family (5), both mother in law and father in law. Has two children in college. Feels safe in her home.      Social Determinants of Health     Financial Resource Strain: Low Risk  (9/28/2023)    Overall Financial Resource Strain (CARDIA)     Difficulty of Paying Living Expenses: Not very hard   Food Insecurity: No Food Insecurity (9/28/2023)    Hunger Vital Sign     Worried About Running Out of Food in the Last Year: Never true     Ran Out of Food in the Last Year: Never true   Transportation Needs: No Transportation Needs (9/28/2023)    PRAPARE - Transportation     Lack of Transportation (Medical): No     Lack of Transportation (Non-Medical): No   Physical Activity: Insufficiently Active (9/28/2023)    Exercise Vital Sign     Days of Exercise per Week: 4 days     Minutes of Exercise per Session: 30 min   Stress: No Stress Concern Present (9/28/2023)    [place] [employer]  [employer]upational [employer] - Occupational Stress Questionnaire     Feeling of Stress : Not at all   Social Connections: Unknown (9/28/2023)    Social Connection and Isolation Panel [NHANES]     Frequency of Communication with Friends and Family: Never     Frequency of Social Gatherings with Friends and Family: Never     Active Member of Clubs or Organizations: No     Attends Club or Organization Meetings: Never     Marital Status:    Housing Stability: Unknown (9/28/2023)    Housing Stability Vital Sign     Unable to Pay for Housing in the Last Year: No     Number of Places Lived in the Last Year: 1       Review of Systems   Constitutional:  Negative for chills, fatigue and fever.   HENT:  Negative for sore throat.    Eyes:  Negative for photophobia.   Respiratory:  Negative for chest tightness and shortness of breath.    Cardiovascular:  Negative for chest pain and palpitations.   Gastrointestinal:  Positive for anal bleeding and rectal pain. Negative for abdominal distention, nausea and vomiting.   Genitourinary:  Negative for dysuria and hematuria.   Musculoskeletal:  Negative for myalgias.   Skin:  Negative for wound.   Neurological:  Negative for light-headedness.       Objective:      Physical Exam      Physical Exam:  There were no vitals taken for this visit.  General: well developed, no distress  Head: normocephalic  Neck: supple, symmetrical, trachea midline  Lungs:  normal respiratory effort  Heart: regular rate and rhythm  Abdomen: soft, non-tender non-distented; bowel sounds normal; no masses,  no organomegaly  Extremities: no cyanosis or edema, or clubbing    Laboratory Studies:  Complete Blood Count:  Lab Results   Component Value Date/Time    WBC 6.09 03/17/2023 09:30 AM    HGB 10.8 (L) 03/17/2023 09:30 AM    HCT 33.6 (L) 03/17/2023 09:30 AM    RBC 4.15 03/17/2023 09:30 AM     03/17/2023 09:30 AM       Basic Chemistry Panel:  Lab Results   Component Value Date/Time     (L) 03/17/2023  "09:30 AM    K 4.0 03/17/2023 09:30 AM     03/17/2023 09:30 AM    CO2 23 03/17/2023 09:30 AM    BUN 21 (H) 03/17/2023 09:30 AM    CREATININE 0.8 03/17/2023 09:30 AM    CALCIUM 9.5 03/17/2023 09:30 AM       No results found for: "CRP"        Assessment:       1. Anal fissure    2. Cystocele, midline      44yo female presents with fissure with refractory symptoms despite conservative therapy.  Plan:       To OR for botox injection vs lateral internal sphincterotomy   We reviewed surgical treatment options include Botox injection or lateral internal sphincterotomy (LIS).  Botox has a reported success rate of 40-70%, and can be associated with temporary changes in continence to gas/liquid stool.  LIS has a success rate of %, but is associated with changes in continence in 8-30% of patients at 6 years post-op.  These changes may be minor (I.e. Gas or liquid stool only).     Obie Bhatti MD  Colon & Rectal Fellow     "

## 2023-12-26 NOTE — PLAN OF CARE
Discharge instructions reviewed and handout given to pt. Pt states ready to go home.  Instructed to go to the er if nunable to void within 6 hours of discharge

## 2023-12-29 LAB
FINAL PATHOLOGIC DIAGNOSIS: NORMAL
GROSS: NORMAL
Lab: NORMAL

## 2024-01-06 NOTE — PROGRESS NOTES
"[facility], [place], operated by [facility] - UROGYNECOLOGY  [address] [place] [id number]  January 9, 2024     Manuela Quinones  [id number]  1980    UROGYN POST-OP  1/9/2024     Manuela Quinones is a 43 y.o. here for a post operative visit.    OPERATIVE NOTE  12/26/2023   Procedure: Procedure(s) (LRB):  INJECTION, BOTULINUM TOXIN, TYPE A (N/A)--rectum with paruch  COLPORRHAPHY, ANTERIOR (N/A)  CYSTOSCOPY, WITH PERIURETHRAL BULKING AGENT INJECTION (N/A)  HEMORRHOIDECTOMY--paruch  PERINEOPLASTY            HISTORY SINCE LAST VISIT:  Denies pain or bleeding. Scant tan discharge.  Bladder issues: Denies UI, urgency, or frequency  Bowel issues: denies constipation or straining. Taking glycolax daily  Reports she went back to work this week    Past Medical History:   Diagnosis Date    Anemia     "life long"    High cholesterol     Hypothyroidism     Unspecified hemorrhoids     Unspecified hemorrhoids        Past Surgical History:   Procedure Laterality Date    ANTERIOR VAGINAL REPAIR N/A 12/26/2023    Procedure: COLPORRHAPHY, ANTERIOR;  Surgeon: Maria Ines Castanon MD;  Location: [address];  Service: OB/GYN;  Laterality: N/A;    COLONOSCOPY N/A 08/14/2023    Procedure: COLONOSCOPY;  Surgeon: Prabha Dow MD;  Location: [facility]);  Service: Endoscopy;  Laterality: N/A;  inst via portal  possible banding  pre call confirmed    COLONOSCOPY, WITH HEMORRHOID BANDING      CYSTOSCOPY WITH INJECTION OF PERIURETHRAL BULKING AGENT N/A 12/26/2023    Procedure: CYSTOSCOPY, WITH PERIURETHRAL BULKING AGENT INJECTION;  Surgeon: Maria Ines Castanon MD;  Location: [address];  Service: OB/GYN;  Laterality: N/A;    EXCISIONAL HEMORRHOIDECTOMY  12/26/2023    Procedure: HEMORRHOIDECTOMY;  Surgeon: Prabha Dow MD;  Location: [address];  Service: Colon and Rectal;;    INJECTION OF BOTULINUM TOXIN TYPE A N/A 12/26/2023    Procedure: INJECTION, BOTULINUM TOXIN, TYPE A;  Surgeon: Prabha Dow MD;  Location: [facility]" 2ND FLR;  Service: Colon and Rectal;  Laterality: N/A;    PERINEOPLASTY  12/26/2023    Procedure: PERINEOPLASTY;  Surgeon: Maria Ines Castanon MD;  Location: [facility] OR 2ND FLR;  Service: OB/GYN;;    TUBAL LIGATION         Family History   Problem Relation Age of Onset    Hyperlipidemia Mother     Hyperlipidemia Father     Gout Father     Hyperlipidemia Brother     No Known Problems Brother     Lung cancer Maternal Uncle     Breast cancer Cousin     Colon cancer Neg Hx     Diabetes Neg Hx     Hypertension Neg Hx     Ovarian cancer Neg Hx     Stroke Neg Hx        Social History     Socioeconomic History    Marital status:     Number of children: 3   Tobacco Use    Smoking status: Never    Smokeless tobacco: Never   Substance and Sexual Activity    Alcohol use: Yes     Comment: socially    Drug use: Never    Sexual activity: Yes     Partners: Male     Birth control/protection: See Surgical Hx   Social History Narrative    Lives with spouse, child (6 yo daughter), and brother-in law's family (5), both mother in law and father in law. Has two children in college. Feels safe in her home.      Social Determinants of Health     Financial Resource Strain: Low Risk  (9/28/2023)    Overall Financial Resource Strain (CARDIA)     Difficulty of Paying Living Expenses: Not very hard   Food Insecurity: No Food Insecurity (9/28/2023)    Hunger Vital Sign     Worried About Running Out of Food in the Last Year: Never true     Ran Out of Food in the Last Year: Never true   Transportation Needs: No Transportation Needs (9/28/2023)    PRAPARE - Transportation     Lack of Transportation (Medical): No     Lack of Transportation (Non-Medical): No   Physical Activity: Insufficiently Active (9/28/2023)    Exercise Vital Sign     Days of Exercise per Week: 4 days     Minutes of Exercise per Session: 30 min   Stress: No Stress Concern Present (9/28/2023)    [employer] [place] [employer] - Occupational Stress Questionnaire      Feeling of Stress : Not at all   Social Connections: Unknown (9/28/2023)    Social Connection and Isolation Panel [NHANES]     Frequency of Communication with Friends and Family: Never     Frequency of Social Gatherings with Friends and Family: Never     Active Member of Clubs or Organizations: No     Attends Club or Organization Meetings: Never     Marital Status:    Housing Stability: Unknown (9/28/2023)    Housing Stability Vital Sign     Unable to Pay for Housing in the Last Year: No     Number of Places Lived in the Last Year: 1       Current Outpatient Medications   Medication Sig Dispense Refill    ferrous sulfate (FEOSOL) 325 mg (65 mg iron) Tab tablet Take 325 mg by mouth daily with breakfast.      ibuprofen (ADVIL,MOTRIN) 600 MG tablet Take 1 tablet (600 mg total) by mouth every 6 (six) hours as needed for Pain. 30 tablet 0    LIDOcaine (XYLOCAINE) 5 % Oint ointment Apply topically as needed (anal pain). 30 g 2    oxyCODONE (ROXICODONE) 5 MG immediate release tablet Take 1 tablet (5 mg total) by mouth every 6 (six) hours as needed for Pain. 25 tablet 0    polyethylene glycol (GLYCOLAX) 17 gram/dose powder Use cap to measure 17 g, mix with liquid, and take by mouth once daily for 24 days 510 g 0    psyllium (HYDROCIL) packet Take 1 packet by mouth once daily. 30 packet 6     No current facility-administered medications for this visit.     Facility-Administered Medications Ordered in Other Visits   Medication Dose Route Frequency Provider Last Rate Last Admin    0.9%  NaCl infusion   Intravenous Continuous Mirna Tracey NP        ceFAZolin 2 g in dextrose 5 % in water (D5W) 50 mL IVPB (MB+)  2 g Intravenous On Call Procedure Maria Ines Castanon MD        famotidine tablet 20 mg  20 mg Oral On Call Procedure Maria Ines Castanon MD        mupirocin 2 % ointment   Nasal On Call Procedure Mirna Tracey NP        mupirocin 2 % ointment   Nasal On Call Procedure Maria Ines Castanon MD   Given at  "12/26/23 0625       Review of patient's allergies indicates:  No Known Allergies     ROS  Per HPI    Well Woman   Dexa:Mammogram: 7/11/22 BIRADS 1 negative  Pap smear: 6/16/22 Negative, HPV negative    Physical Exam  /64 (BP Location: Right arm, Patient Position: Sitting, BP Method: Medium (Automatic))   Pulse 70   Ht 5' 4" (1.626 m)   Wt 62.2 kg (137 lb 2 oz)   LMP 12/20/2023   BMI 23.54 kg/m²   General: alert and oriented, no acute distress  Respiratory: normal respiratory effort  Abd: soft, non-tender, non-distended    Pelvic:  Ext. Genitalia: normal external genitalia. Normal bartholin's and skeens glands  Vagina: -- atrophy. Normal vaginal mucosa without lesions. No discharge noted. Incision healing well-- sutures intact   Non-tender bladder base without palpable mass.  Cervix: no lesions  Uterus:  uterus is normal size, shape, consistency and nontender   Urethra: no masses or tenderness  Urethral meatus: no lesions, caruncle or prolapse.  Rectum: incision healing-- sutures present--deferred        Impression  1. Post-operative state          We reviewed the above issues and discussed options for short-term versus long-term management of her problems.     Plan:   Post op healing well. Continue to follow post op instructions.  Continue to control bowel movements  Try to work from home for the next 2 weeks  Patient will follow up with us in 4 weekss    20 minutes were spent in face to face time with this patient  90 % of this time was spent in counseling and/or coordination of care    CATHERINE Hays-BC  [facility]  Division of Female Pelvic Medicine and Reconstructive Surgery  Department of Obstetrics & Gynecology       "

## 2024-01-09 ENCOUNTER — OFFICE VISIT (OUTPATIENT)
Dept: UROGYNECOLOGY | Facility: CLINIC | Age: 44
End: 2024-01-09
Payer: COMMERCIAL

## 2024-01-09 VITALS
BODY MASS INDEX: 23.41 KG/M2 | WEIGHT: 137.13 LBS | HEART RATE: 70 BPM | DIASTOLIC BLOOD PRESSURE: 64 MMHG | HEIGHT: 64 IN | SYSTOLIC BLOOD PRESSURE: 104 MMHG

## 2024-01-09 DIAGNOSIS — Z98.890 POST-OPERATIVE STATE: Primary | ICD-10-CM

## 2024-01-09 PROCEDURE — 99999 PR PBB SHADOW E&M-EST. PATIENT-LVL IV: CPT | Mod: PBBFAC,,, | Performed by: NURSE PRACTITIONER

## 2024-01-09 PROCEDURE — 99024 POSTOP FOLLOW-UP VISIT: CPT | Mod: S$GLB,,, | Performed by: NURSE PRACTITIONER

## 2024-01-09 NOTE — PATIENT INSTRUCTIONS
Post op healing well. Continue to follow post op instructions.  Continue to control bowel movements  Try to work from home for the next 2 weeks  Patient will follow up with us in 4 weeks

## 2024-01-29 ENCOUNTER — TELEPHONE (OUTPATIENT)
Dept: SURGERY | Facility: CLINIC | Age: 44
End: 2024-01-29
Payer: COMMERCIAL

## 2024-01-29 ENCOUNTER — PATIENT MESSAGE (OUTPATIENT)
Dept: SURGERY | Facility: CLINIC | Age: 44
End: 2024-01-29
Payer: COMMERCIAL

## 2024-02-01 ENCOUNTER — TELEPHONE (OUTPATIENT)
Dept: SURGERY | Facility: CLINIC | Age: 44
End: 2024-02-01
Payer: COMMERCIAL

## 2024-02-02 ENCOUNTER — OFFICE VISIT (OUTPATIENT)
Dept: SURGERY | Facility: CLINIC | Age: 44
End: 2024-02-02
Attending: COLON & RECTAL SURGERY
Payer: COMMERCIAL

## 2024-02-02 VITALS
SYSTOLIC BLOOD PRESSURE: 107 MMHG | DIASTOLIC BLOOD PRESSURE: 76 MMHG | WEIGHT: 137 LBS | HEART RATE: 71 BPM | BODY MASS INDEX: 23.52 KG/M2

## 2024-02-02 DIAGNOSIS — Z98.890 POST-OPERATIVE STATE: Primary | ICD-10-CM

## 2024-02-02 PROCEDURE — 99999 PR PBB SHADOW E&M-EST. PATIENT-LVL III: CPT | Mod: PBBFAC,,, | Performed by: COLON & RECTAL SURGERY

## 2024-02-02 PROCEDURE — 99024 POSTOP FOLLOW-UP VISIT: CPT | Mod: S$GLB,,, | Performed by: COLON & RECTAL SURGERY

## 2024-02-07 ENCOUNTER — OFFICE VISIT (OUTPATIENT)
Dept: UROGYNECOLOGY | Facility: CLINIC | Age: 44
End: 2024-02-07
Payer: COMMERCIAL

## 2024-02-07 VITALS
HEIGHT: 64 IN | WEIGHT: 137.13 LBS | DIASTOLIC BLOOD PRESSURE: 70 MMHG | SYSTOLIC BLOOD PRESSURE: 98 MMHG | BODY MASS INDEX: 23.41 KG/M2

## 2024-02-07 DIAGNOSIS — Z98.890 POST-OPERATIVE STATE: Primary | ICD-10-CM

## 2024-02-07 PROCEDURE — 99999 PR PBB SHADOW E&M-EST. PATIENT-LVL III: CPT | Mod: PBBFAC,,, | Performed by: OBSTETRICS & GYNECOLOGY

## 2024-02-07 PROCEDURE — 99024 POSTOP FOLLOW-UP VISIT: CPT | Mod: S$GLB,,, | Performed by: OBSTETRICS & GYNECOLOGY

## 2024-02-07 NOTE — PROGRESS NOTES
CRS Office Visit Follow-up    SUBJECTIVE:     History of Present Illness:  Patient is a 43 y.o. female who presents following excisional hemorrhoidectomy, Botox injection on 12/26/2023. Their post-operative course was uncomplicated. There are no new complaints today.    Final pathology:  Anal canal, right anterior, hemorrhoidectomy:   - Mildly inflamed/ thrombosed hemorrhoid     Review of Systems:  ROS    OBJECTIVE:     Vital Signs (Most Recent)  /76   Pulse 71   Wt 62.1 kg (137 lb 0.3 oz)   BMI 23.52 kg/m²     Physical Exam:  General: White female in no distress   Neuro: Alert and oriented x 4.  Moves all extremities.     HEENT: No icterus.  Trachea midline  Respiratory: Respirations are even and unlabored  Cardiac: Regular rate  Extremities: Warm dry and intact  Skin: No rashes  Anorectal: Incision healing well      ASSESSMENT/PLAN:     44yo F s/p excisional hemorrhoidectomy, Botox injection on 12/26/2023, doing well    RTC prn    Prabha Dow MD  Staff Surgeon, Colon and Rectal Surgery  [facility]

## 2024-02-07 NOTE — PROGRESS NOTES
"CC: Post-operative visit      HPI:  Patient is a 43 y.o. female  presents today for a follow up.   She denies vaginal bleeding. She denies vaginal heaviness, pressure and bulge. She denies vaginal and pelvic pain. She denies urinary urgency, frequency, urgency urinary incontinence, and stress urinary incontinence.     REVIEW OF SYSTEMS:  Per HPI. All other reviewed systems are negative.       PHYSICAL EXAMINATION  BP 98/70   Ht 5' 4" (1.626 m)   Wt 62.2 kg (137 lb 2 oz)   LMP 2024 (Approximate)   BMI 23.54 kg/m²     General: Healthy in appearance, Well nourished, Affect Normal, NAD.  Gastrointestinal: Non tender, Non distended, No masses, guarding or rebound  Ext: No clubbing, cyanosis, edema or varicosities.    Genitourinary-  Vulva: normal without lesions, masses, atrophy or pain  Urethra: meatus central and normal in appearance, no prolapse/caruncle, no masses or discharge. Empty supine stress test was negative.   Bladder: non-tender, no masses  Vagina: No discharge or lesions, well rugated epithelium, no atrophy, no masses appreciated, Mesh erosion: n/a.  [See POP-Q]  Cervix: no lesions, no discharge  Uterus:  non-tender, anteverted  Adnexa: no masses, non tender.      POP-Q Exam- pelvic organ prolapse quantitative    Aa -2.5  Anterior Wall Ba -2.5  Anterior wall C -6  Cervix or cuff   Gh 4    Genital hiatus pb 4    perineal body tvl 11  Total vaginal length   Ap -2.5  Posterior wall Bp -2.5  Posterior wall D -10.5  Posterior formix     with Uterus  Stage 1      No visits with results within 1 Day(s) from this visit.   Latest known visit with results is:   Admission on 2023, Discharged on 2023   Component Date Value Ref Range Status    POC Preg Test, Ur 2023 Negative  Negative Final     Acceptable 2023 Yes   Final    Group & Rh 2023 A POS   Final    Indirect Jessie 2023 NEG   Final    Specimen Outdate 2023 23:59   Final    Final " Pathologic Diagnosis 12/26/2023    Final                    Value:Anal canal, right anterior, hemorrhoidectomy:  - Mildly inflamed/ thrombosed hemorrhoid      Gross 12/26/2023    Final                    Value:Hospital/clinic label MRN:  [id number]  Pathology label MRN:  [id number]    The specimen is received in formalin labeled &quot;right anterior hemorrhoid&quot;. The specimen consists of a tan-pink fragment of soft tissue measuring 2.4 x 1.5 x 1.2 cm. The specimen is inked black at the presumed resection margin and serially   sectioned to reveal a thrombosed vessel. Representative sections are submitted in cassette [id number].      Yesica Choen S  Grossing Technologist      Disclaimer 12/26/2023 Unless the case is a 'gross only' or additional testing only, the final diagnosis for each specimen is based on a microscopic examination of appropriate tissue sections.   Final        ASSESSMENT & PLAN:    Post-operative state       44 yo s/p anal sphincter botox injections and Hemorrhoidectomy with Dr. Dow, Anterior colporrhaphy, Bulkamid injections, cystoscopy, and perineoplasty on 12/26/23 presented today for a postoperative visit. Patient has healed well. Has no symptoms of prolapse or incontinence. We discussed that she may resume all normal activities including intercourse. Cautioned that she should resume physical activity/ exercise slowly to avoid musculoskeletal injuries. She will return for re-evaluation in 4.5 months.     All questions were answered today. The patient was encouraged to contact the office as needed with any additional questions or concerns.     Total time spent on visit was 20 minutes.  This includes face to face time and non-face to face time preparing to see the patient (eg, review of tests), Obtaining and/or reviewing separately obtained history, Documenting clinical information in the electronic or other health record, Independently interpreting resultsand communicating results to  the patient/family/caregiver, or Care coordination.      Maria Ines Castanon MD

## 2024-06-20 DIAGNOSIS — Z00.00 ANNUAL PHYSICAL EXAM: Primary | ICD-10-CM

## 2024-07-25 ENCOUNTER — CLINICAL SUPPORT (OUTPATIENT)
Dept: INTERNAL MEDICINE | Facility: CLINIC | Age: 44
End: 2024-07-25
Attending: FAMILY MEDICINE
Payer: COMMERCIAL

## 2024-07-25 ENCOUNTER — HOSPITAL ENCOUNTER (OUTPATIENT)
Dept: CARDIOLOGY | Facility: HOSPITAL | Age: 44
Discharge: HOME | End: 2024-07-25
Attending: FAMILY MEDICINE
Payer: COMMERCIAL

## 2024-07-25 ENCOUNTER — OFFICE VISIT (OUTPATIENT)
Dept: FAMILY MEDICINE | Facility: CLINIC | Age: 44
End: 2024-07-25
Attending: FAMILY MEDICINE
Payer: COMMERCIAL

## 2024-07-25 ENCOUNTER — HOSPITAL ENCOUNTER (OUTPATIENT)
Dept: RADIOLOGY | Facility: HOSPITAL | Age: 44
Discharge: HOME | End: 2024-07-25
Attending: FAMILY MEDICINE
Payer: COMMERCIAL

## 2024-07-25 VITALS
SYSTOLIC BLOOD PRESSURE: 110 MMHG | DIASTOLIC BLOOD PRESSURE: 70 MMHG | HEIGHT: 64 IN | BODY MASS INDEX: 23.14 KG/M2 | WEIGHT: 135.56 LBS | HEART RATE: 73 BPM | OXYGEN SATURATION: 100 %

## 2024-07-25 DIAGNOSIS — Z00.00 ANNUAL PHYSICAL EXAM: Primary | ICD-10-CM

## 2024-07-25 DIAGNOSIS — Z00.00 ANNUAL PHYSICAL EXAM: ICD-10-CM

## 2024-07-25 DIAGNOSIS — Z12.31 OTHER SCREENING MAMMOGRAM: ICD-10-CM

## 2024-07-25 DIAGNOSIS — Z00.00 WELLNESS EXAMINATION: Primary | ICD-10-CM

## 2024-07-25 LAB
ALBUMIN SERPL BCP-MCNC: 4.3 G/DL (ref 3.5–5.2)
ALP SERPL-CCNC: 48 U/L (ref 55–135)
ALT SERPL W/O P-5'-P-CCNC: 51 U/L (ref 10–44)
ANION GAP SERPL CALC-SCNC: 9 MMOL/L (ref 8–16)
AST SERPL-CCNC: 36 U/L (ref 10–40)
BILIRUB SERPL-MCNC: 0.3 MG/DL (ref 0.1–1)
BUN SERPL-MCNC: 11 MG/DL (ref 6–20)
CALCIUM SERPL-MCNC: 10.1 MG/DL (ref 8.7–10.5)
CHLORIDE SERPL-SCNC: 105 MMOL/L (ref 95–110)
CHOLEST SERPL-MCNC: 215 MG/DL (ref 120–199)
CHOLEST/HDLC SERPL: 5.8 {RATIO} (ref 2–5)
CO2 SERPL-SCNC: 24 MMOL/L (ref 23–29)
CREAT SERPL-MCNC: 0.8 MG/DL (ref 0.5–1.4)
ERYTHROCYTE [DISTWIDTH] IN BLOOD BY AUTOMATED COUNT: 16.6 % (ref 11.5–14.5)
EST. GFR  (NO RACE VARIABLE): >60 ML/MIN/1.73 M^2
ESTIMATED AVG GLUCOSE: 108 MG/DL (ref 68–131)
FERRITIN SERPL-MCNC: 6 NG/ML (ref 20–300)
GLUCOSE SERPL-MCNC: 89 MG/DL (ref 70–110)
HBA1C MFR BLD: 5.4 % (ref 4–5.6)
HCT VFR BLD AUTO: 32.9 % (ref 37–48.5)
HDLC SERPL-MCNC: 37 MG/DL (ref 40–75)
HDLC SERPL: 17.2 % (ref 20–50)
HGB BLD-MCNC: 10 G/DL (ref 12–16)
LDLC SERPL CALC-MCNC: 136.8 MG/DL (ref 63–159)
MCH RBC QN AUTO: 23.6 PG (ref 27–31)
MCHC RBC AUTO-ENTMCNC: 30.4 G/DL (ref 32–36)
MCV RBC AUTO: 78 FL (ref 82–98)
NONHDLC SERPL-MCNC: 178 MG/DL
OHS QRS DURATION: 90 MS
OHS QTC CALCULATION: 430 MS
PLATELET # BLD AUTO: 283 K/UL (ref 150–450)
PMV BLD AUTO: 9.8 FL (ref 9.2–12.9)
POTASSIUM SERPL-SCNC: 5 MMOL/L (ref 3.5–5.1)
PROT SERPL-MCNC: 7.8 G/DL (ref 6–8.4)
RBC # BLD AUTO: 4.24 M/UL (ref 4–5.4)
SODIUM SERPL-SCNC: 138 MMOL/L (ref 136–145)
T4 FREE SERPL-MCNC: 0.76 NG/DL (ref 0.71–1.51)
TRIGL SERPL-MCNC: 206 MG/DL (ref 30–150)
TSH SERPL DL<=0.005 MIU/L-ACNC: 4.43 UIU/ML (ref 0.4–4)
WBC # BLD AUTO: 5.88 K/UL (ref 3.9–12.7)

## 2024-07-25 PROCEDURE — 83036 HEMOGLOBIN GLYCOSYLATED A1C: CPT | Performed by: FAMILY MEDICINE

## 2024-07-25 PROCEDURE — 85027 COMPLETE CBC AUTOMATED: CPT | Mod: PO | Performed by: FAMILY MEDICINE

## 2024-07-25 PROCEDURE — 84443 ASSAY THYROID STIM HORMONE: CPT | Performed by: FAMILY MEDICINE

## 2024-07-25 PROCEDURE — 99999 PR PBB SHADOW E&M-EST. PATIENT-LVL III: CPT | Mod: PBBFAC,,, | Performed by: FAMILY MEDICINE

## 2024-07-25 PROCEDURE — 84439 ASSAY OF FREE THYROXINE: CPT | Performed by: FAMILY MEDICINE

## 2024-07-25 PROCEDURE — 93005 ELECTROCARDIOGRAM TRACING: CPT | Mod: PO

## 2024-07-25 PROCEDURE — 77067 SCR MAMMO BI INCL CAD: CPT | Mod: 26,,, | Performed by: RADIOLOGY

## 2024-07-25 PROCEDURE — 77063 BREAST TOMOSYNTHESIS BI: CPT | Mod: 26,,, | Performed by: RADIOLOGY

## 2024-07-25 PROCEDURE — 77067 SCR MAMMO BI INCL CAD: CPT | Mod: TC,PO

## 2024-07-25 PROCEDURE — 77063 BREAST TOMOSYNTHESIS BI: CPT | Mod: TC,PO

## 2024-07-25 PROCEDURE — 82728 ASSAY OF FERRITIN: CPT | Performed by: FAMILY MEDICINE

## 2024-07-25 PROCEDURE — 80061 LIPID PANEL: CPT | Performed by: FAMILY MEDICINE

## 2024-07-25 PROCEDURE — 93010 ELECTROCARDIOGRAM REPORT: CPT | Mod: ,,, | Performed by: INTERNAL MEDICINE

## 2024-07-25 PROCEDURE — 80053 COMPREHEN METABOLIC PANEL: CPT | Mod: PO | Performed by: FAMILY MEDICINE

## 2024-07-25 NOTE — PROGRESS NOTES
"July 25, 2024                                                                                                                                                                                                                                                                                      Manuela Quinones  [address]  [place] [address]                                                                                                                                                                                                                                                                                                RE: Manuela Quinones                                                        Clinic #:[id number]                                                                                                                                   Dear  Manuela Childsmayela,                                                                                                                                           Thank you for allowing me to serve you and perform your Executive [facility] exam on July 25, 2024.   This letter will serve a brief summary of the history, physical findings, and laboratory/studies performed and recommendations at that time.                                                                                         REASON FOR VISIT: [facility] Preventive Physical Examination    Past Medical History:   Diagnosis Date    Anemia     "life long"    High cholesterol     Hypothyroidism     Unspecified hemorrhoids     Unspecified hemorrhoids        Past Surgical History:   Procedure Laterality Date    ANTERIOR VAGINAL REPAIR N/A 12/26/2023    Procedure: COLPORRHAPHY, ANTERIOR;  Surgeon: Maria Ines Castanon MD;  Location: [facility] OR [address] [place] [address];  Service: OB/GYN;  Laterality: N/A;    COLONOSCOPY N/A 08/14/2023    Procedure: COLONOSCOPY;  Surgeon: Prabha Dow MD;  Location: [facility] (4TH FLR);  Service: Endoscopy;  " Laterality: N/A;  inst via portal  possible banding  pre call confirmed    COLONOSCOPY, WITH HEMORRHOID BANDING      CYSTOSCOPY WITH INJECTION OF PERIURETHRAL BULKING AGENT N/A 12/26/2023    Procedure: CYSTOSCOPY, WITH PERIURETHRAL BULKING AGENT INJECTION;  Surgeon: Maria Ines Castanon MD;  Location: [facility] OR 2ND FLR;  Service: OB/GYN;  Laterality: N/A;    EXCISIONAL HEMORRHOIDECTOMY  12/26/2023    Procedure: HEMORRHOIDECTOMY;  Surgeon: Prabha Dow MD;  Location: [facility] OR 2ND FLR;  Service: Colon and Rectal;;    INJECTION OF BOTULINUM TOXIN TYPE A N/A 12/26/2023    Procedure: INJECTION, BOTULINUM TOXIN, TYPE A;  Surgeon: Prabha Dow MD;  Location: [facility] OR 2ND FLR;  Service: Colon and Rectal;  Laterality: N/A;    PERINEOPLASTY  12/26/2023    Procedure: PERINEOPLASTY;  Surgeon: Maria Ines Castanon MD;  Location: [facility] OR 2ND FLR;  Service: OB/GYN;;    TUBAL LIGATION         Family History   Problem Relation Name Age of Onset    Hyperlipidemia Mother      Hyperlipidemia Father      Gout Father      Hyperlipidemia Brother      No Known Problems Brother      Lung cancer Maternal Uncle      Breast cancer Cousin maternal     Colon cancer Neg Hx      Diabetes Neg Hx      Hypertension Neg Hx      Ovarian cancer Neg Hx      Stroke Neg Hx         Social History     Socioeconomic History    Marital status:     Number of children: 3   Tobacco Use    Smoking status: Never    Smokeless tobacco: Never   Substance and Sexual Activity    Alcohol use: Yes     Comment: socially    Drug use: Never    Sexual activity: Yes     Partners: Male     Birth control/protection: See Surgical Hx   Social History Narrative    Lives with spouse, child (6 yo daughter), and brother-in law's family (5), both mother in law and father in law. Has two children in college. Feels safe in her home.      Social Determinants of Health     Financial Resource Strain: Low Risk  (9/28/2023)    Overall Financial Resource Strain (CARDIA)     Difficulty  of Paying Living Expenses: Not very hard   Food Insecurity: No Food Insecurity (9/28/2023)    Hunger Vital Sign     Worried About Running Out of Food in the Last Year: Never true     Ran Out of Food in the Last Year: Never true   Transportation Needs: No Transportation Needs (9/28/2023)    PRAPARE - Transportation     Lack of Transportation (Medical): No     Lack of Transportation (Non-Medical): No   Physical Activity: Insufficiently Active (9/28/2023)    Exercise Vital Sign     Days of Exercise per Week: 4 days     Minutes of Exercise per Session: 30 min   Stress: No Stress Concern Present (9/28/2023)    [employer] [place] [employer] - Occupational Stress Questionnaire     Feeling of Stress : Not at all   Housing Stability: Unknown (9/28/2023)    Housing Stability Vital Sign     Unable to Pay for Housing in the Last Year: No     Number of Places Lived in the Last Year: 1       Allergies: Patient has no known allergies.    Current Outpatient Medications   Medication Sig Dispense Refill    ferrous sulfate (FEOSOL) 325 mg (65 mg iron) Tab tablet Take 325 mg by mouth daily with breakfast. (Patient not taking: Reported on 7/25/2024)      ibuprofen (ADVIL,MOTRIN) 600 MG tablet Take 1 tablet (600 mg total) by mouth every 6 (six) hours as needed for Pain. (Patient not taking: Reported on 7/25/2024) 30 tablet 0    LIDOcaine (XYLOCAINE) 5 % Oint ointment Apply topically as needed (anal pain). (Patient not taking: Reported on 7/25/2024) 30 g 2    oxyCODONE (ROXICODONE) 5 MG immediate release tablet Take 1 tablet (5 mg total) by mouth every 6 (six) hours as needed for Pain. (Patient not taking: Reported on 7/25/2024) 25 tablet 0    polyethylene glycol (GLYCOLAX) 17 gram/dose powder Use cap to measure 17 g, mix with liquid, and take by mouth once daily for 24 days (Patient not taking: Reported on 7/25/2024) 510 g 0    psyllium (HYDROCIL) packet Take 1 packet by mouth once daily. (Patient not taking: Reported on  "7/25/2024) 30 packet 6     No current facility-administered medications for this visit.     Facility-Administered Medications Ordered in Other Visits   Medication Dose Route Frequency Provider Last Rate Last Admin    0.9%  NaCl infusion   Intravenous Continuous Mirna Tracey NP        ceFAZolin 2 g in dextrose 5 % in water (D5W) 50 mL IVPB (MB+)  2 g Intravenous On Call Procedure Maria Ines Castanon MD        famotidine tablet 20 mg  20 mg Oral On Call Procedure Maria Ines Castanon MD        mupirocin 2 % ointment   Nasal On Call Procedure Mirna Tracey NP        mupirocin 2 % ointment   Nasal On Call Procedure Maria Ines Castanon MD   Given at 12/26/23 0625       REVIEW OF SYSTEMS:  No recent changes in weight, or complaints of fatigue. No recent changes in vision, or hearing. Denies frequent headaches.No recent changes in voice. No new or changing skin lesions. Denies abnormal bruising or bleeding.  Denies chest pain or sensation of skipped beats. No new onset of shortness of breath, or dyspnea on exertion. Denies abdominal discomfort, constipation, diarrhea,or blood in stool. Denies difficulty with urination.   No recent joint swelling or muscle discomfort. Denies pain or weakness in extremeties. No recent loss of balance. Denies problems with sleep or depression.        Remainder of the review of systems without pertinent positves at this time.                                                                              PHYSICAL EXAM:   VITAL SIGNS: /70   Pulse 73   Ht 5' 4" (1.626 m)   Wt 61.5 kg (135 lb 9.3 oz)   LMP 07/08/2024 (Exact Date)   SpO2 100%   BMI 23.27 kg/m²   GENERAL APPEARANCE:  Well nourished and normally developed,  pleasant 44 y.o. female, in good spirits.  SKIN: Without rashes or overt lesions.  HEENT: Head normacephalic. There was no scleral icterus. Mucous membranes were moist. Dentition. Neck is supple, no thyromegally, or carotid bruits.  LUNGS: Clear to auscultation " bilaterally. Normal respiratory effort.  HEART: Exam reveals regular rate and rhythm. First and second heart sounds normal. No murmurs, rubs or gallops.   ABDOMEN: Soft, non-tender, non-distended. Exam reveals normal bowl sounds, no masses, no organomegaly and no aortic enlargement.    EXTREMITIES:  Nonedematous, both femoral and pedal pulses are normal. No joint stiffness or tenderness. Full range of motion and strength, upper and lower bilaterally.      ASSESSMENT/RECOMMENDATIONS :  Wellness exam    At this time,  you appear to be in good medical condition.    Continue to work on regular exercise, maintenance of a healthy weight, balanced diet rich in fruits/vegetables and lean protein, and avoidance of unhealthy habits like smoking and excessive alcohol intake.  I look forward to seeing you again next year.    Please contact me should you have any questions or concerns regarding physical findings, or my recommendations.       Sincerely yours,        ELIZABETH Cohen M.D.  Department of Family Practice  [facility]

## 2024-08-03 ENCOUNTER — PATIENT MESSAGE (OUTPATIENT)
Dept: FAMILY MEDICINE | Facility: CLINIC | Age: 44
End: 2024-08-03
Payer: COMMERCIAL

## 2024-08-28 ENCOUNTER — OFFICE VISIT (OUTPATIENT)
Dept: FAMILY MEDICINE | Facility: CLINIC | Age: 44
End: 2024-08-28
Payer: COMMERCIAL

## 2024-08-28 ENCOUNTER — PATIENT MESSAGE (OUTPATIENT)
Dept: FAMILY MEDICINE | Facility: CLINIC | Age: 44
End: 2024-08-28

## 2024-08-28 DIAGNOSIS — R14.0 GASES: ICD-10-CM

## 2024-08-28 DIAGNOSIS — R79.89 ELEVATED TSH: ICD-10-CM

## 2024-08-28 DIAGNOSIS — D50.9 IRON DEFICIENCY ANEMIA, UNSPECIFIED IRON DEFICIENCY ANEMIA TYPE: Primary | ICD-10-CM

## 2024-08-28 RX ORDER — SIMETHICONE 80 MG
80 TABLET,CHEWABLE ORAL EVERY 6 HOURS PRN
Qty: 30 TABLET | Refills: 5 | Status: SHIPPED | OUTPATIENT
Start: 2024-08-28

## 2024-08-28 RX ORDER — IRON,CARBONYL 25 MG
1 TABLET ORAL DAILY
Qty: 30 TABLET | Refills: 5 | Status: SHIPPED | OUTPATIENT
Start: 2024-08-28

## 2024-08-28 NOTE — PROGRESS NOTES
"The patient location is:  [place]  The chief complaint leading to consultation is:  Checkup  Visit type: Virtual visit with synchronous audio and video  Total time spent with patient:  Less than 30 minutes  Each patient to whom he or she provides medical services by telemedicine is:  (1) informed of the relationship between the physician and patient and the respective role of any other health care provider with respect to management of the patient; and (2) notified that he or she may decline to receive medical services by telemedicine and may withdraw from such care at any time. Vital signs recorded were provided by the patient.    Notes:  See below    Subjective:   Patient ID: Manuela Quinones is a 44 y.o. female     Chief Complaint: Checkup    Here for checkup.      Review of Systems   Respiratory:  Negative for shortness of breath.    Cardiovascular:  Negative for chest pain.   Gastrointestinal:  Negative for abdominal pain.   Genitourinary:  Negative for dysuria.     Past Medical History:   Diagnosis Date    Anemia     "life long"    High cholesterol     Hypothyroidism     Unspecified hemorrhoids     Unspecified hemorrhoids      Past Surgical History:   Procedure Laterality Date    ANTERIOR VAGINAL REPAIR N/A 12/26/2023    Procedure: COLPORRHAPHY, ANTERIOR;  Surgeon: Maria Ines Castanon MD;  Location: [address];  Service: OB/GYN;  Laterality: N/A;    COLONOSCOPY N/A 08/14/2023    Procedure: COLONOSCOPY;  Surgeon: Prabha Dow MD;  Location: [facility]);  Service: Endoscopy;  Laterality: N/A;  inst via portal  possible banding  pre call confirmed    COLONOSCOPY, WITH HEMORRHOID BANDING      CYSTOSCOPY WITH INJECTION OF PERIURETHRAL BULKING AGENT N/A 12/26/2023    Procedure: CYSTOSCOPY, WITH PERIURETHRAL BULKING AGENT INJECTION;  Surgeon: Maria Ines Castanon MD;  Location: [address];  Service: OB/GYN;  Laterality: N/A;    EXCISIONAL HEMORRHOIDECTOMY  12/26/2023    Procedure: HEMORRHOIDECTOMY;  " Surgeon: Prabha Dow MD;  Location: [facility] OR 2ND FLR;  Service: Colon and Rectal;;    INJECTION OF BOTULINUM TOXIN TYPE A N/A 12/26/2023    Procedure: INJECTION, BOTULINUM TOXIN, TYPE A;  Surgeon: Prabha Dow MD;  Location: [facility] OR 2ND FLR;  Service: Colon and Rectal;  Laterality: N/A;    PERINEOPLASTY  12/26/2023    Procedure: PERINEOPLASTY;  Surgeon: Maria Ines Castanon MD;  [facility];  Service: OB/GYN;;    TUBAL LIGATION       Objective:   There were no vitals filed for this visit.  There is no height or weight on file to calculate BMI.  Physical Exam  Vitals and nursing note reviewed.   Constitutional:       Appearance: She is well-developed.   HENT:      Head: Normocephalic and atraumatic.   Eyes:      General: No scleral icterus.     Conjunctiva/sclera: Conjunctivae normal.   Pulmonary:      Effort: Pulmonary effort is normal. No respiratory distress.   Musculoskeletal:         General: No deformity. Normal range of motion.      Cervical back: Normal range of motion and neck supple.   Skin:     Coloration: Skin is not pale.      Findings: No rash.   Neurological:      Mental Status: She is alert and oriented to person, place, and time.   Psychiatric:         Behavior: Behavior normal.         Thought Content: Thought content normal.         Judgment: Judgment normal.       Assessment:     1. Iron deficiency anemia, unspecified iron deficiency anemia type    2. Gases    3. Elevated TSH      Plan:   Iron deficiency anemia, unspecified iron deficiency anemia type  -     iron, carbonyl (PERFECT IRON) 25 mg iron Tab; Take 1 tablet by mouth once daily.  Dispense: 30 tablet; Refill: 5  -     CBC Auto Differential; Future; Expected date: 08/28/2024  -     FERRITIN; Future; Expected date: 08/28/2024    Gases  -     simethicone (MYLICON) 80 MG chewable tablet; Take 1 tablet (80 mg total) by mouth every 6 (six) hours as needed (gas).  Dispense: 30 tablet; Refill: 5    Elevated TSH  -     TSH;  Future; Expected date: 08/28/2024            Total time spent of Less than 30 minutes minutes on the day of the visit.This includes face to face time and preparing to see the patient, obtaining and reviewing separately obtained history, documenting clinical information in the electronic or other health record, independently interpreting results and communicating results to the patient/family/caregiver, or care coordinator.    Established patient with me has been instructed that must see me at least 1 time yearly (every 365 days) for refills of medications. Seeing other providers in this clinic is fine but expectation is to see me yearly.    Seth Ramirez MD  08/28/2024    Portions of this note have been dictated with KINGSLEY Moore

## 2024-08-28 NOTE — PATIENT INSTRUCTIONS
John Roy,     If you are due for any health screening(s) below please notify me so we can arrange them to be ordered and scheduled to maintain your health. Most healthy patients complete it. Don't lose out on improving your health.     All of your core healthy metrics are met.

## 2025-06-11 DIAGNOSIS — Z00.00 ANNUAL PHYSICAL EXAM: Primary | ICD-10-CM

## 2025-07-18 ENCOUNTER — HOSPITAL ENCOUNTER (OUTPATIENT)
Dept: CARDIOLOGY | Facility: HOSPITAL | Age: 45
Discharge: HOME | End: 2025-07-18
Attending: FAMILY MEDICINE
Payer: COMMERCIAL

## 2025-07-18 ENCOUNTER — OFFICE VISIT (OUTPATIENT)
Dept: FAMILY MEDICINE | Facility: CLINIC | Age: 45
End: 2025-07-18
Attending: FAMILY MEDICINE
Payer: COMMERCIAL

## 2025-07-18 ENCOUNTER — CLINICAL SUPPORT (OUTPATIENT)
Dept: INTERNAL MEDICINE | Facility: CLINIC | Age: 45
End: 2025-07-18
Attending: FAMILY MEDICINE
Payer: COMMERCIAL

## 2025-07-18 VITALS
BODY MASS INDEX: 23.22 KG/M2 | WEIGHT: 136 LBS | DIASTOLIC BLOOD PRESSURE: 70 MMHG | HEART RATE: 64 BPM | SYSTOLIC BLOOD PRESSURE: 94 MMHG | OXYGEN SATURATION: 99 % | HEIGHT: 64 IN

## 2025-07-18 DIAGNOSIS — D50.9 IRON DEFICIENCY ANEMIA, UNSPECIFIED IRON DEFICIENCY ANEMIA TYPE: ICD-10-CM

## 2025-07-18 DIAGNOSIS — Z00.00 ANNUAL PHYSICAL EXAM: Primary | ICD-10-CM

## 2025-07-18 DIAGNOSIS — Z00.00 WELLNESS EXAMINATION: Primary | ICD-10-CM

## 2025-07-18 DIAGNOSIS — Z00.00 ANNUAL PHYSICAL EXAM: ICD-10-CM

## 2025-07-18 LAB
ALBUMIN SERPL BCP-MCNC: 4.2 G/DL (ref 3.5–5.2)
ALP SERPL-CCNC: 37 UNIT/L (ref 40–150)
ALT SERPL W/O P-5'-P-CCNC: 20 UNIT/L (ref 10–44)
ANION GAP (OHS): 9 MMOL/L (ref 8–16)
AST SERPL-CCNC: 17 UNIT/L (ref 11–45)
BILIRUB SERPL-MCNC: 0.4 MG/DL (ref 0.1–1)
BUN SERPL-MCNC: 19 MG/DL (ref 6–20)
CALCIUM SERPL-MCNC: 9.7 MG/DL (ref 8.7–10.5)
CHLORIDE SERPL-SCNC: 105 MMOL/L (ref 95–110)
CHOLEST SERPL-MCNC: 246 MG/DL (ref 120–199)
CHOLEST/HDLC SERPL: 5.3 {RATIO} (ref 2–5)
CO2 SERPL-SCNC: 24 MMOL/L (ref 23–29)
CREAT SERPL-MCNC: 0.8 MG/DL (ref 0.5–1.4)
EAG (OHS): 105 MG/DL (ref 68–131)
ERYTHROCYTE [DISTWIDTH] IN BLOOD BY AUTOMATED COUNT: 17.4 % (ref 11.5–14.5)
FERRITIN SERPL-MCNC: 5 NG/ML (ref 20–300)
GFR SERPLBLD CREATININE-BSD FMLA CKD-EPI: >60 ML/MIN/1.73/M2
GLUCOSE SERPL-MCNC: 94 MG/DL (ref 70–110)
HBA1C MFR BLD: 5.3 % (ref 4–5.6)
HCT VFR BLD AUTO: 30.1 % (ref 37–48.5)
HDLC SERPL-MCNC: 46 MG/DL (ref 40–75)
HDLC SERPL: 18.7 % (ref 20–50)
HGB BLD-MCNC: 9.1 GM/DL (ref 12–16)
LDLC SERPL CALC-MCNC: 171.6 MG/DL (ref 63–159)
MCH RBC QN AUTO: 22.5 PG (ref 27–31)
MCHC RBC AUTO-ENTMCNC: 30.2 G/DL (ref 32–36)
MCV RBC AUTO: 75 FL (ref 82–98)
NONHDLC SERPL-MCNC: 200 MG/DL
OHS QRS DURATION: 86 MS
OHS QTC CALCULATION: 422 MS
PLATELET # BLD AUTO: 258 K/UL (ref 150–450)
PMV BLD AUTO: 10 FL (ref 9.2–12.9)
POTASSIUM SERPL-SCNC: 4.5 MMOL/L (ref 3.5–5.1)
PROT SERPL-MCNC: 7.9 GM/DL (ref 6–8.4)
RBC # BLD AUTO: 4.04 M/UL (ref 4–5.4)
SODIUM SERPL-SCNC: 138 MMOL/L (ref 136–145)
T4 FREE SERPL-MCNC: 0.9 NG/DL (ref 0.71–1.51)
TRIGL SERPL-MCNC: 142 MG/DL (ref 30–150)
TSH SERPL-ACNC: 4.08 UIU/ML (ref 0.4–4)
WBC # BLD AUTO: 5.44 K/UL (ref 3.9–12.7)

## 2025-07-18 PROCEDURE — 83036 HEMOGLOBIN GLYCOSYLATED A1C: CPT

## 2025-07-18 PROCEDURE — 85027 COMPLETE CBC AUTOMATED: CPT | Mod: PO

## 2025-07-18 PROCEDURE — 84439 ASSAY OF FREE THYROXINE: CPT

## 2025-07-18 PROCEDURE — 80061 LIPID PANEL: CPT

## 2025-07-18 PROCEDURE — 93010 ELECTROCARDIOGRAM REPORT: CPT | Mod: ,,, | Performed by: INTERNAL MEDICINE

## 2025-07-18 PROCEDURE — 93005 ELECTROCARDIOGRAM TRACING: CPT | Mod: PO

## 2025-07-18 PROCEDURE — 84443 ASSAY THYROID STIM HORMONE: CPT

## 2025-07-18 PROCEDURE — 84075 ASSAY ALKALINE PHOSPHATASE: CPT | Mod: PO

## 2025-07-18 PROCEDURE — 82728 ASSAY OF FERRITIN: CPT

## 2025-07-18 PROCEDURE — 99999 PR PBB SHADOW E&M-EST. PATIENT-LVL III: CPT | Mod: PBBFAC,,, | Performed by: FAMILY MEDICINE

## 2025-07-18 NOTE — PROGRESS NOTES
"July 18, 2025                                                                                                                                                                                                                                                                                      Manuela Quinones  [address]  [place] [address]                                                                                                                                                                                                                                                                                                RE: Manuela Quinones                                                        Clinic #:[id number]                                                                                                                                   Dear  Manuela Childsmayela,                                                                                                                                           Thank you for allowing me to serve you and perform your Executive Health exam on July 18, 2025.   This letter will serve a brief summary of the history, physical findings, and laboratory/studies performed and recommendations at that time.                                                                                         REASON FOR VISIT: Executive Health Preventive Physical Examination    Past Medical History:   Diagnosis Date    Anemia     "life long"    High cholesterol     Hypothyroidism     Unspecified hemorrhoids     Unspecified hemorrhoids        Past Surgical History:   Procedure Laterality Date    ANTERIOR VAGINAL REPAIR N/A 12/26/2023    Procedure: COLPORRHAPHY, ANTERIOR;  Surgeon: Maria Ines Castanon MD;  Location: [facility] OR [address] [place] [address];  Service: OB/GYN;  Laterality: N/A;    COLONOSCOPY N/A 08/14/2023    Procedure: COLONOSCOPY;  Surgeon: Prabha Dow MD;  Location: [facility] (4TH FLR);  Service: Endoscopy;  " Laterality: N/A;  inst via portal  possible banding  pre call confirmed    COLONOSCOPY, WITH HEMORRHOID BANDING      CYSTOSCOPY WITH INJECTION OF PERIURETHRAL BULKING AGENT N/A 12/26/2023    Procedure: CYSTOSCOPY, WITH PERIURETHRAL BULKING AGENT INJECTION;  Surgeon: Maria Ines Castanon MD;  [facility];  Service: OB/GYN;  Laterality: N/A;    EXCISIONAL HEMORRHOIDECTOMY  12/26/2023    Procedure: HEMORRHOIDECTOMY;  Surgeon: Prabha Dow MD;  Location: [facility] OR [facility] FLR;  Service: Colon and Rectal;;    INJECTION OF BOTULINUM TOXIN TYPE A N/A 12/26/2023    Procedure: INJECTION, BOTULINUM TOXIN, TYPE A;  Surgeon: Prabha Dow MD;  Location: [facility] OR [facility] FLR;  Service: Colon and Rectal;  Laterality: N/A;    PERINEOPLASTY  12/26/2023    Procedure: PERINEOPLASTY;  Surgeon: Maria Ines Castanon MD;  [facility];  Service: OB/GYN;;    TUBAL LIGATION         Family History   Problem Relation Name Age of Onset    Hyperlipidemia Mother      Hyperlipidemia Father      Gout Father      Hyperlipidemia Brother      No Known Problems Brother      Lung cancer Maternal Uncle      Breast cancer Cousin maternal     Colon cancer Neg Hx      Diabetes Neg Hx      Hypertension Neg Hx      Ovarian cancer Neg Hx      Stroke Neg Hx         Social History[1]    Allergies: Patient has no known allergies.    Current Medications[2]    REVIEW OF SYSTEMS:  No recent changes in weight, or complaints of fatigue. No recent changes in vision, or hearing. Denies frequent headaches.No recent changes in voice. No new or changing skin lesions. Denies abnormal bruising or bleeding.  Denies chest pain or sensation of skipped beats. No new onset of shortness of breath, or dyspnea on exertion. Denies abdominal discomfort, constipation, diarrhea,or blood in stool. Denies difficulty with urination.   No recent joint swelling or muscle discomfort. Denies pain or weakness in extremeties. No recent loss of balance. Denies problems with  "sleep or depression.        Remainder of the review of systems without pertinent positves at this time.                                                                              PHYSICAL EXAM:   VITAL SIGNS: BP 94/70   Pulse 64   Ht 5' 4" (1.626 m)   Wt 61.7 kg (136 lb 0.4 oz)   SpO2 99%   BMI 23.35 kg/m²   GENERAL APPEARANCE:  Well nourished and normally developed,  pleasant 45 y.o. female, in good spirits.  SKIN: Without rashes or overt lesions.  HEENT: Head normacephalic. There was no scleral icterus. Mucous membranes were moist. Dentition. Neck is supple, no thyromegally, or carotid bruits.  LUNGS: Clear to auscultation bilaterally. Normal respiratory effort.  HEART: Exam reveals regular rate and rhythm. First and second heart sounds normal. No murmurs, rubs or gallops.   ABDOMEN: Soft, non-tender, non-distended. Exam reveals normal bowl sounds, no masses, no organomegaly and no aortic enlargement.    EXTREMITIES:  Nonedematous, both femoral and pedal pulses are normal. No joint stiffness or tenderness. Full range of motion and strength, upper and lower bilaterally.      ASSESSMENT/RECOMMENDATIONS :  Wellness exam    At this time,  you appear to be in good medical condition.    Continue to work on regular exercise, maintenance of a healthy weight, balanced diet rich in fruits/vegetables and lean protein, and avoidance of unhealthy habits like smoking and excessive alcohol intake.  I look forward to seeing you again next year.    Please contact me should you have any questions or concerns regarding physical findings, or my recommendations.       Sincerely yours,        ELIZABETH Cohen M.D.  Department of Family Practice  [facility]         [1]   Social History  Socioeconomic History    Marital status:     Number of children: 3   Tobacco Use    Smoking status: Never    Smokeless tobacco: Never   Substance and Sexual Activity    Alcohol use: Yes     Comment: socially    Drug use: " Never    Sexual activity: Yes     Partners: Male     Birth control/protection: See Surgical Hx   Social History Narrative    Lives with spouse, child (6 yo daughter), and brother-in law's family (5), both mother in law and father in law. Has two children in college. Feels safe in her home.      Social Drivers of Health     Financial Resource Strain: Low Risk  (8/22/2024)    Overall Financial Resource Strain (CARDIA)     Difficulty of Paying Living Expenses: Not hard at all   Food Insecurity: No Food Insecurity (8/22/2024)    Hunger Vital Sign     Worried About Running Out of Food in the Last Year: Never true     Ran Out of Food in the Last Year: Never true   Transportation Needs: No Transportation Needs (9/28/2023)    PRAPARE - Transportation     Lack of Transportation (Medical): No     Lack of Transportation (Non-Medical): No   Physical Activity: Insufficiently Active (8/22/2024)    Exercise Vital Sign     Days of Exercise per Week: 2 days     Minutes of Exercise per Session: 10 min   Stress: No Stress Concern Present (8/22/2024)    [employer] - Occupational Stress Questionnaire     Feeling of Stress : Only a little   Housing Stability: Unknown (9/28/2023)    Housing Stability Vital Sign     Unable to Pay for Housing in the Last Year: No     Number of Places Lived in the Last Year: 1   [2]   Current Outpatient Medications   Medication Sig Dispense Refill    ferrous sulfate (FEOSOL) 325 mg (65 mg iron) Tab tablet Take 325 mg by mouth daily with breakfast. (Patient not taking: Reported on 7/25/2024)      ibuprofen (ADVIL,MOTRIN) 600 MG tablet Take 1 tablet (600 mg total) by mouth every 6 (six) hours as needed for Pain. (Patient not taking: Reported on 7/25/2024) 30 tablet 0    iron, carbonyl (PERFECT IRON) 25 mg iron Tab Take 1 tablet by mouth once daily. 30 tablet 5    LIDOcaine (XYLOCAINE) 5 % Oint ointment Apply topically as needed (anal pain). (Patient not taking: Reported on  7/25/2024) 30 g 2    oxyCODONE (ROXICODONE) 5 MG immediate release tablet Take 1 tablet (5 mg total) by mouth every 6 (six) hours as needed for Pain. (Patient not taking: Reported on 7/25/2024) 25 tablet 0    polyethylene glycol (GLYCOLAX) 17 gram/dose powder Use cap to measure 17 g, mix with liquid, and take by mouth once daily for 24 days (Patient not taking: Reported on 7/25/2024) 510 g 0    psyllium (HYDROCIL) packet Take 1 packet by mouth once daily. (Patient not taking: Reported on 7/25/2024) 30 packet 6    simethicone (MYLICON) 80 MG chewable tablet Take 1 tablet (80 mg total) by mouth every 6 (six) hours as needed (gas). 30 tablet 5     No current facility-administered medications for this visit.     Facility-Administered Medications Ordered in Other Visits   Medication Dose Route Frequency Provider Last Rate Last Admin    0.9%  NaCl infusion   Intravenous Continuous Mirna Tineo, NP        ceFAZolin 2 g in dextrose 5 % in water (D5W) 50 mL IVPB (MB+)  2 g Intravenous On Call Procedure Maria Ines Castanon MD        famotidine tablet 20 mg  20 mg Oral On Call Procedure Maria Ines Castanon MD        mupirocin 2 % ointment   Nasal On Call Procedure Mirna Tineo, NP        mupirocin 2 % ointment   Nasal On Call Procedure Maria Ines Castanon MD   Given at 12/26/23 0625

## (undated) DEVICE — GLOVE SENSICARE PI GRN 6.5

## (undated) DEVICE — PACK LAPSCP/PELVSCPY III TIBRN

## (undated) DEVICE — DRAPE UND BUTT W/POUCH 40X44IN

## (undated) DEVICE — ADHESIVE DERMABOND ADVANCED

## (undated) DEVICE — TAPE SILK 3IN

## (undated) DEVICE — SUT 2-0 VICRYL / SH (J417)

## (undated) DEVICE — TOWEL OR DISP STRL BLUE 4/PK

## (undated) DEVICE — SUT VICRYL 2-0 CT-2 VCP269H

## (undated) DEVICE — SYR IRRIGATION BULB STER 60ML

## (undated) DEVICE — DRAPE STERI INSTRUMENT 1018

## (undated) DEVICE — Device

## (undated) DEVICE — DRESSING GZ SURGICOUNT 4X8

## (undated) DEVICE — PANTIES FEMININE NAPKIN LG/XLG

## (undated) DEVICE — ADHESIVE MASTISOL VIAL 48/BX

## (undated) DEVICE — BRIEF MESH LARGE

## (undated) DEVICE — SUT MCRYL PLUS 4-0 PS2 27IN

## (undated) DEVICE — NDL HYPO REG 25G X 1 1/2

## (undated) DEVICE — CONTAINER SPEC OR STRL 4.5OZ

## (undated) DEVICE — DRAPE LAP T SHT W/ INSTR PAD

## (undated) DEVICE — SUT VICRYL PLUS 0 CT1 36IN

## (undated) DEVICE — GLOVE SENSICARE PI SURG 7

## (undated) DEVICE — BOWL STERILE LARGE 32OZ

## (undated) DEVICE — TUBING SUC UNIV W/CONN 12FT

## (undated) DEVICE — CLIPPER BLADE MOD 4406 (CAREF)

## (undated) DEVICE — TRAY CATH FOL SIL URIMTR 16FR

## (undated) DEVICE — TRAY SKIN SCRUB WET STANDARD

## (undated) DEVICE — HOOK STAY ELAS 5MM 8EA/PK

## (undated) DEVICE — SYR B-D DISP CONTROL 10CC100/C

## (undated) DEVICE — SYR 10CC LUER LOCK

## (undated) DEVICE — PAD PREP CUFFED NS 24X48IN

## (undated) DEVICE — SET CYSTO IRR DRP CHMBR 84IN

## (undated) DEVICE — ELECTRODE REM PLYHSV RETURN 9

## (undated) DEVICE — RETRACTOR LONE STAR 28.3X18.3

## (undated) DEVICE — TRAY MINOR GEN SURG OMC

## (undated) DEVICE — TRAY SKIN SCRUB WET PREMIUM

## (undated) DEVICE — TIP YANKAUERS BULB NO VENT

## (undated) DEVICE — BLADE SURG CARBON STEEL SZ11

## (undated) DEVICE — DRAPE UINDERBUT GRAD PCH

## (undated) DEVICE — LUBRICANT SURGILUBE 2 OZ

## (undated) DEVICE — SOL NACL IRR 1000ML BTL

## (undated) DEVICE — SPONGE GAUZE 16PLY 4X4

## (undated) DEVICE — SOL IRR WATER STRL 3000 ML

## (undated) DEVICE — SET IRR URLGY 2LINE UNIV SPIKE